# Patient Record
Sex: MALE | Race: WHITE | NOT HISPANIC OR LATINO | Employment: OTHER | URBAN - METROPOLITAN AREA
[De-identification: names, ages, dates, MRNs, and addresses within clinical notes are randomized per-mention and may not be internally consistent; named-entity substitution may affect disease eponyms.]

---

## 2017-02-23 ENCOUNTER — TRANSFERRED RECORDS (OUTPATIENT)
Dept: HEALTH INFORMATION MANAGEMENT | Facility: CLINIC | Age: 67
End: 2017-02-23

## 2017-03-01 ENCOUNTER — TRANSFERRED RECORDS (OUTPATIENT)
Dept: HEALTH INFORMATION MANAGEMENT | Facility: CLINIC | Age: 67
End: 2017-03-01

## 2017-03-03 ENCOUNTER — TRANSFERRED RECORDS (OUTPATIENT)
Dept: HEALTH INFORMATION MANAGEMENT | Facility: CLINIC | Age: 67
End: 2017-03-03

## 2017-03-06 ENCOUNTER — TRANSFERRED RECORDS (OUTPATIENT)
Dept: HEALTH INFORMATION MANAGEMENT | Facility: CLINIC | Age: 67
End: 2017-03-06

## 2017-03-09 ENCOUNTER — TRANSFERRED RECORDS (OUTPATIENT)
Dept: HEALTH INFORMATION MANAGEMENT | Facility: CLINIC | Age: 67
End: 2017-03-09

## 2017-04-13 ENCOUNTER — MEDICAL CORRESPONDENCE (OUTPATIENT)
Dept: CARDIAC REHAB | Facility: CLINIC | Age: 67
End: 2017-04-13

## 2017-04-13 ENCOUNTER — HOSPITAL ENCOUNTER (OUTPATIENT)
Dept: CARDIAC REHAB | Facility: CLINIC | Age: 67
End: 2017-04-13
Attending: INTERNAL MEDICINE
Payer: MEDICARE

## 2017-04-13 VITALS — WEIGHT: 200.4 LBS | BODY MASS INDEX: 25.72 KG/M2 | HEIGHT: 74 IN

## 2017-04-13 PROCEDURE — 93798 PHYS/QHP OP CAR RHAB W/ECG: CPT | Performed by: OCCUPATIONAL THERAPIST

## 2017-04-13 PROCEDURE — 40000575 ZZH STATISTIC OP CARDIAC VISIT #2: Performed by: OCCUPATIONAL THERAPIST

## 2017-04-13 PROCEDURE — 40000116 ZZH STATISTIC OP CR VISIT: Performed by: OCCUPATIONAL THERAPIST

## 2017-04-13 PROCEDURE — 93797 PHYS/QHP OP CAR RHAB WO ECG: CPT | Mod: 59 | Performed by: OCCUPATIONAL THERAPIST

## 2017-04-13 ASSESSMENT — 6 MINUTE WALK TEST (6MWT)
TOTAL DISTANCE WALKED (FT): 1378
GENDER SELECTION: MALE
FEMALE CALC: 1572.1
PREDICTED: 2069.88
MALE CALC: 2057.34

## 2017-04-13 NOTE — PROGRESS NOTES
" 04/13/17 1400   Session  Simon Martines  AVReplacement, CABG x 1, LIMA   Session Initial Evaluation and Exercise Prescription   Certified through this date 05/12/17   Cardiac Rehab Assessment  I have established, reviewed and made necessary changes to the individualized treatment plan and exercise prescription for this patient.    Physician Name (printed): ________________________   Date: _______  Time: ______    Physician Signature: ___________________________________________   Cardiac Rehab Assessment Pt and wife presented as interested, but wife was much more enthusiastic as Pt stated he is \"not an exercise enthusiast\".  He is willing to participate and established 3 goals.  His recovery at home has been uneventful.  Pt will benefit from skilled services for progressive, monitored exercise, targeted education for risk factor control, and behavior change counseling to advance him in risk factor control and home program establishment.     The patient's history and clinical status including hemodynamics and ECG were evaluated.  The patient was assessed to be stable and appropriate to begin exercise.   The patient's functional capacity and exercise prescription were determined by the completion of the 6 minute walk test.  See results below.  The patient was oriented to the program.  Risk factor profile was completed. Goals and objectives were discussed and established with Pt. CV response was WNL. No symptoms, complaints or pain were reported. Good prognosis for reaching goals.   Plan to progress to 30-35 minutes of exercise prior to discharge from cardiac rehab.  Initial THR of 20-30 beats above RHR; Effort rating of 4-6.  Initiate muscle conditioning as appropriate.  Provide risk factor education and behavior change counseling.    General Information   Treatment Diagnosis Valve Replacement  (Aortic)   Date of Treatment Diagnosis 03/01/17   Secondary Treatment Diagnosis Coronary Artery Bypass  (x 1 LIMA)   Significant " "Past CV History (post op SVT, paroxysmal atrial fibrillation)   Comorbidities Malignancy;Renal Disease  (renal cell carcinoma, s/p nephrectomy)   Other Medical History CKD, HTN, depression.   Lead up symptoms no sx, identified in exam   Hospital Location [facility]   Hospital Discharge Date 03/09/17   Signs and Symptoms Post Hospital Discharge None   Outpatient Cardiac Rehab Start Date 04/13/17   Primary Physician Henrry Dowling   Primary Physician Follow Up Advised to schedule appointment   Surgeon Nick Loredo   Surgeon Follow Up NA   Cardiologist Kavita Florentino   Cardiologist Follow Up Scheduled  (May 2017)   Ejection Fraction 35-40% post surgery  (next scheduled in 2 weeks)   Risk Stratification Moderate   Summary of Cath Report   Summary of Cath Report No information available   Living and Work Status    Living Arrangements and Social Status house   Support System Live with an adult   Return to Employment Retired   Occupation    Preventative Medications   CMS recommended medications Antiplatelets;Lipid Lowering;Anticoagulants;Ace inhibitors;Beta Blocker;Pneumonia vaccination  (anticipate off Coumadin eventually)   Falls Screen   Have you fallen two or more times in the past year? No   Have you fallen and had an injury in the past year? No   Pain   Patient Currently in Pain Yes   Pain Location L shoulder  (surgical related)   Pain Rating 2/10  (can go up to9)   Pain Description Ache;Sharp;Dull   Pain Description Comment encouraged Pt to resume Tylenol consistently.  This shoulder has been \"weak\" according to Pt for several years, surgery has just exacerbated this, including some positional numbness.    Pain Treatment Recommendations appropriate stretching   Physical Assessments   Incisions WNL   Edema None   Right Lung Sounds not assessed   Left Lung Sounds not assessed  (Pt recalls MD stating small amount of fluid in L lung)   Limitations Surgical  (sternal)   Individualized Treatment Plan   Monitored " "Sessions Scheduled 24   Monitored Sessions Attended 1   Oxygen   Supplemental Oxygen needed No   Nutrition Management - Weight Management   Assessment Initial Assessment   Age 66   Weight 90.9 kg (200 lb 6.4 oz)   Height 1.88 m (6' 2\")   BMI (Calculated) 25.78   Initial Rate Your Plate Score. Dietary tool to assess eating patterns. Scores range from 24 to 72. The higher the score the healthier the eating pattern. 57   Nutrition Management - Lipids   Lipids Labs Available   Date 09/30/16   Total Cholesterol 226   Triglycerides 235   HDL 42      Prescribed Lipid Medication Yes   Statin Intensity Intensity Not Indicated   Nutrition Management - Diabetes   Diabetes No   Nutrition Management Summary   Dietary Recommendations Low Fat;Low Cholesterol;Low Sodium;Renal  (Pt has gout restrictions)   Stages of Change for Diet Compliance Contemplation   Interventions Planned (none per Pt, wife plans to attend classes.)   Psychosocial Management   Psychosocial Assessment Initial   Is there history of clinical depression or increased risk of depression? History of clinical depression   Current Level of Stress per Patient Report Mild   Current Coping Skills (Paxil, breathing, ETOH, )   Initial Patient Health Questionnaire -9 Score (PHQ-9) for depression. 5-9 Minimal symptoms, 10-14 Minor depression, 15-19 Major depression, moderately severe, > 20 Major depression, severe  10   Initial [facility] Survey score.  Quality of Life:   If total score > 25 review individual areas where patient rated a 4 or 5.  Consider patients current medical condition and what role that plays on the score.   Adjust treatment protocol to improve areas of concern.  Consider the following:  PHQ9 score, DASI, and re-assessment within the next 30 days to assist with developing treatments.  30   Stages of Change Maintenance   Interventions Planned (none per Pt)   Psychosocial Comments Pt admits to occasional anxiety   Other Core Components - " Hypertension   History of or Diagnosis of Hypertension Yes   Currently taking Anti-Hypertensives Yes;Beta blocker;Ace Inhibitor   Other Core Components - Tobacco   History of Tobacco Use Never  (heavy second hand smoke)   Other Core Components Summary   Patient Goals Yes   Goal #1 Description to establish a home exercise program of 3 times a week for 30-60 minutes.   Goal #1 Target Date 06/13/17   Activity/Exercise History   Activity/Exercise Assessment Initial   Activity/Exercise Status prior to event? Sedentary   Number of Days Currently participating in Moderate Physical Activity? 1-4   Number of Days Currently performing  Aerobic Exercise (including rehab)? 2   Number of Minutes per Session Currently of Aerobic Exercise (average)? 10   Current Stage of Change (Physical Activity) Preparation   Current Stage of Change (Aerobic Exercise) Preparation   Patient Goals Goal #1;Goal #2   Goal #1 Description to be able to walk 1 flight of stairs without effort and a second with no significant SOB.   Goal #1 Target Date 06/13/17   Goal #2 Description to be able to walk 60 min with moderate pace/ some inclines with stable CV responses.   Goal #2 Target Date 06/13/17   Activity/Exercise Comments Pt is still hesitant about how much he wants to continue with aerobic exercise post rehab.   Exercise Assessment   6 Minute Walk Predicted - Gender Selection Male   6 Minute Walk Predicted (Male) 2057.34   6 Minute Walk Predicted (Female) 1572.1   Initial 6 Minute Walk Distance (Feet) 1378 ft   Resting HR 65 bpm   Exercise HR 80 bpm   Post Exercise HR 66 bpm   Resting /84   Exercise /78   Post Exercise /78   Effort Rating 6   Current MET Level 3   MET Level Goal 5-6   ECG Rhythm Normal sinus rhythm   Ectopy PACs   Current Symptoms Fatigue;Dyspnea   Limitations/Restrictions Sternal Precautions   Exercise Prescription   Mode Treadmill;Nustep;Airdyne;Weights   Duration/Time 15-30 min   Frequency 3 daysweek   THR (85%  of age predicted max HR) 131.75   OMNI Effort Rating (0-10 Scale) 4-6/10   Progression Intermittent bouts;Total exercise time of 20-30 minutes;Aerobic exercise to OMNI rating of 6 or below and at or below THR;Progress peak intensity by 1/4 MET per week   Recommended Home Exercise   Type of Exercise Walking   Frequency (days per week) 1-3   Duration (minutes per session) 15-30 min  (start with 10 min discontinuous bouts)   Effort Rating Recommended 4-6/10   30 Day Exercise Plan Pt will be walking 20-30 continuous min at least 1 day off rehab.   Current Home Exercise   Type of Exercise Walking   Frequency (days per week) 1   Duration (minutes per session) 10   Learning Assessment   Learner Patient;Spouse/Significant Other   Primary Language English   Barriers to Learning Visual  (glasses)   Patient Education   Education recommended Other (see comment)  (Pt declined, wife plans to attend nutrition classes)

## 2017-04-18 ENCOUNTER — HOSPITAL ENCOUNTER (OUTPATIENT)
Dept: CARDIAC REHAB | Facility: CLINIC | Age: 67
End: 2017-04-18
Attending: INTERNAL MEDICINE
Payer: MEDICARE

## 2017-04-18 PROCEDURE — 40000116 ZZH STATISTIC OP CR VISIT: Performed by: REHABILITATION PRACTITIONER

## 2017-04-18 PROCEDURE — 93798 PHYS/QHP OP CAR RHAB W/ECG: CPT | Performed by: REHABILITATION PRACTITIONER

## 2017-04-20 ENCOUNTER — HOSPITAL ENCOUNTER (OUTPATIENT)
Dept: CARDIAC REHAB | Facility: CLINIC | Age: 67
End: 2017-04-20
Attending: INTERNAL MEDICINE
Payer: MEDICARE

## 2017-04-20 PROCEDURE — 93798 PHYS/QHP OP CAR RHAB W/ECG: CPT | Performed by: REHABILITATION PRACTITIONER

## 2017-04-20 PROCEDURE — 40000116 ZZH STATISTIC OP CR VISIT: Performed by: REHABILITATION PRACTITIONER

## 2017-04-25 ENCOUNTER — HOSPITAL ENCOUNTER (OUTPATIENT)
Dept: CARDIAC REHAB | Facility: CLINIC | Age: 67
End: 2017-04-25
Attending: INTERNAL MEDICINE
Payer: MEDICARE

## 2017-04-25 VITALS — HEIGHT: 74 IN | WEIGHT: 201.4 LBS | BODY MASS INDEX: 25.85 KG/M2

## 2017-04-25 PROCEDURE — 93798 PHYS/QHP OP CAR RHAB W/ECG: CPT | Performed by: OCCUPATIONAL THERAPIST

## 2017-04-25 PROCEDURE — 40000116 ZZH STATISTIC OP CR VISIT: Performed by: OCCUPATIONAL THERAPIST

## 2017-04-25 ASSESSMENT — 6 MINUTE WALK TEST (6MWT)
TOTAL DISTANCE WALKED (FT): 1378
GENDER SELECTION: MALE
MALE CALC: 2055.72
FEMALE CALC: 1568.96
PREDICTED: 2068.26

## 2017-04-25 NOTE — PROGRESS NOTES
" 04/25/17 1200   Session    Simon Martines  66 year old  Aortic Valve Replacement   CABG x 1    Physician cosignature/electronic signature indicates approval of this ITP document. I have established, reviewed and made necessary changes to the individualized treatment plan and exercise prescription for this patient.     Session 30 Day Individualized Treatment Plan   Certified through this date 06/10/17   Cardiac Rehab Assessment   Cardiac Rehab Assessment Pt and wife presented as interested, but wife was much more enthusiastic as Pt stated he is \"not an exercise enthusiast\".  He is willing to participate and established 3 goals.  His recovery at home has been uneventful.  Pt will benefit from skilled services for progressive, monitored exercise, targeted education for risk factor control, and behavior change counseling to advance him in risk factor control and home program establishment. 4/25/17 Patient is gradually starting to feel better in his recovery. His SOB is improving, engergy levels are increasing and incisional soreness is lessening. Patient currently tolerates a MET level of 3.1. He will initiate home exercise after today walking on his off days. Patient continues to benefit from skilled therapy to monitor CV response to exercise, to educate on risk management and behavior change to achieve patient's goals.    General Information   Treatment Diagnosis Valve Replacement  (Aortic)   Date of Treatment Diagnosis 03/01/17   Secondary Treatment Diagnosis Coronary Artery Bypass  (x 1 LIMA)   Significant Past CV History (post op SVT, paroxysmal atrial fibrillation)   Comorbidities Malignancy;Renal Disease  (renal cell carcinoma, s/p nephrectomy)   Other Medical History CKD, HTN, depression.   Lead up symptoms no sx, identified in exam   Hospital Location [facility]   Hospital Discharge Date 03/09/17   Signs and Symptoms Post Hospital Discharge None   Outpatient Cardiac Rehab Start Date 04/13/17   Primary Physician " "Henrry Dowling   Primary Physician Follow Up Advised to schedule appointment   Surgeon Nick Loredo   Surgeon Follow Up NA   Cardiologist Kavita Florentino   Cardiologist Follow Up Scheduled  (May 2017)   Ejection Fraction 50-55%   Risk Stratification High   Summary of Cath Report   Summary of Cath Report No information available   Living and Work Status    Living Arrangements and Social Status house   Support System Live with an adult   Return to Employment Retired   Occupation    Preventative Medications   CMS recommended medications Antiplatelets;Lipid Lowering;Anticoagulants;Ace inhibitors;Beta Blocker;Pneumonia vaccination  (anticipate off Coumadin eventually)   Falls Screen   Have you fallen two or more times in the past year? No   Have you fallen and had an injury in the past year? No   Pain   Patient Currently in Pain Yes   Pain Location L shoulder, upper back pain   (surgical related)   Pain Rating 2/10  (can go up to9)   Pain Description Ache;Sharp;Dull   Pain Description Comment encouraged Pt to resume Tylenol consistently.  This shoulder has been \"weak\" according to Pt for several years, surgery has just exacerbated this, including some positional numbness.  4/25/17 Patient feels left shoulder pain is improving, but reports mild upper back pain, which improves with movement.   Pain Treatment Recommendations appropriate stretching 4/25/17 Encouraged patient to start engaging in surgical stretches regularly. Demonstrated stretches to patient and he verbally agreed to start practicing them at home to help with back pain.   Physical Assessments   Incisions WNL   Edema None   Right Lung Sounds not assessed   Left Lung Sounds not assessed   Limitations Surgical  (sternal)   Individualized Treatment Plan   Monitored Sessions Scheduled 24   Monitored Sessions Attended 4   Oxygen   Supplemental Oxygen needed No   Nutrition Management - Weight Management   Assessment Re-assessment   Age 66   Weight 91.4 kg " "(201 lb 6.4 oz)   Height 1.88 m (6' 2.02\")   BMI (Calculated) 25.9   Initial Rate Your Plate Score. Dietary tool to assess eating patterns. Scores range from 24 to 72. The higher the score the healthier the eating pattern. 57   Nutrition Management - Lipids   Lipids Labs Available   Date 09/30/16   Total Cholesterol 226   Triglycerides 235   HDL 42      Prescribed Lipid Medication Yes   Statin Intensity Intensity Not Indicated   Nutrition Management - Diabetes   Diabetes No   Nutrition Management Summary   Dietary Recommendations Low Fat;Low Cholesterol;Low Sodium;Renal  (Pt has gout restrictions)   Stages of Change for Diet Compliance Contemplation   Interventions Planned (none per Pt, wife plans to attend classes.)   Psychosocial Management   Psychosocial Assessment Re-assessment   Is there history of clinical depression or increased risk of depression? History of clinical depression   Current Level of Stress per Patient Report Mild   Current Coping Skills (Paxil, breathing, ETOH, )   Initial Patient Health Questionnaire -9 Score (PHQ-9) for depression. 5-9 Minimal symptoms, 10-14 Minor depression, 15-19 Major depression, moderately severe, > 20 Major depression, severe  10   Initial [employer] Survey score.  Quality of Life:   If total score > 25 review individual areas where patient rated a 4 or 5.  Consider patients current medical condition and what role that plays on the score.   Adjust treatment protocol to improve areas of concern.  Consider the following:  PHQ9 score, DASI, and re-assessment within the next 30 days to assist with developing treatments.  30   Stages of Change Maintenance   Interventions Planned Patient to verbalize understanding of behavioral assessment results;Reassess PHQ-9 and/or [employer] Surveys if outside of defined limits   Psychosocial Comments Pt admits to occasional anxiety   Other Core Components - Hypertension   History of or Diagnosis of Hypertension Yes " "  Currently taking Anti-Hypertensives Yes;Beta blocker;Ace Inhibitor   Hypertension Comments 4/25/17 BP has been WNLs   Other Core Components - Tobacco   History of Tobacco Use Never  (heavy second hand smoke)   Other Core Components Summary   Patient Goals Yes   Goal #1 Description to establish a home exercise program of 3 times a week for 30-60 minutes.   Goal #1 Target Date 06/13/17   Goal #1 Progress Towards Goal 4/25/17 Patient has not been walking on his off days of rehab, but agreed to start after today.   Activity/Exercise History   Activity/Exercise Assessment Re-assessment   Activity/Exercise Status prior to event? Sedentary   Number of Days Currently participating in Moderate Physical Activity? 1-4   Number of Days Currently performing  Aerobic Exercise (including rehab)? 3   Number of Minutes per Session Currently of Aerobic Exercise (average)? 35   Current Stage of Change (Physical Activity) Preparation   Current Stage of Change (Aerobic Exercise) Preparation   Patient Goals Goal #1;Goal #2   Goal #1 Description to be able to walk 1 flight of stairs without effort and a second with no significant SOB.   Goal #1 Target Date 06/13/17   Goal #1 Progress Towards Goal 4/25/17 Patient has noticed improvement with SOB, especially while laying down. Patient continues to feel SOB while stair climbing. He will continue to progress towards this goal.   Goal #2 Description to be able to walk 60 min with moderate pace/ some inclines with stable CV responses.   Goal #2 Target Date 06/13/17   Goal #2 Progress Towards Goal 4/25/17 Reviewed goal with patient. Patient is not currently engaging in home exercise, but will start after today. Patient stated, \"I really need to start doing it. I will start doing it.\" He currently tolerates walking on treadmill for 15 minutes at a peak MET level of 2.5. Patient feels this level is appropriately challenging.    Activity/Exercise Comments Pt is still hesitant about how much he " wants to continue with aerobic exercise post rehab.   Exercise Assessment   6 Minute Walk Predicted - Gender Selection Male   6 Minute Walk Predicted (Male) 2055.72   6 Minute Walk Predicted (Female) 1568.96   Initial 6 Minute Walk Distance (Feet) 1378 ft   Resting HR 68 bpm   Exercise HR 80 bpm   Post Exercise HR 70 bpm   Resting /78   Exercise /64   Post Exercise /76   Effort Rating 5   Current MET Level 3.1   MET Level Goal 5-6   ECG Rhythm Normal sinus rhythm   Ectopy PVCs   Current Symptoms Fatigue;Dyspnea;Incisional pain   Limitations/Restrictions Sternal Precautions   Exercise Prescription   Mode Treadmill;Nustep;Airdyne;Weights   Duration/Time 15-30 min   Frequency 3 daysweek   THR (85% of age predicted max HR) 131.75   OMNI Effort Rating (0-10 Scale) 4-6/10   Progression Intermittent bouts;Total exercise time of 20-30 minutes;Aerobic exercise to OMNI rating of 6 or below and at or below THR;Progress peak intensity by 1/4 MET per week   Recommended Home Exercise   Type of Exercise Walking   Frequency (days per week) 1-3   Duration (minutes per session) 15-30 min  (start with 10 min discontinuous bouts)   Effort Rating Recommended 4-6/10   30 Day Exercise Plan Pt will be walking 20-30 continuous min at least 1 day off rehab.   Current Home Exercise   Type of Exercise None   Frequency (days per week) 0   Duration (minutes per session) 0   Follow-up/On-going Support   Provider follow-up needed on the following No follow-up needed   Learning Assessment   Learner Patient;Spouse/Significant Other   Primary Language English   Barriers to Learning Visual  (glasses)   Patient Education   Education recommended Other (see comment)  (Pt declined, wife plans to attend nutrition classes)   Education classes attended Patient Declined/Refused All Education

## 2017-04-27 ENCOUNTER — HOSPITAL ENCOUNTER (OUTPATIENT)
Dept: CARDIAC REHAB | Facility: CLINIC | Age: 67
End: 2017-04-27
Attending: INTERNAL MEDICINE
Payer: MEDICARE

## 2017-04-27 PROCEDURE — 93798 PHYS/QHP OP CAR RHAB W/ECG: CPT | Performed by: OCCUPATIONAL THERAPIST

## 2017-04-27 PROCEDURE — 40000116 ZZH STATISTIC OP CR VISIT: Performed by: OCCUPATIONAL THERAPIST

## 2017-04-28 ENCOUNTER — HOSPITAL ENCOUNTER (OUTPATIENT)
Dept: CARDIAC REHAB | Facility: CLINIC | Age: 67
End: 2017-04-28
Attending: INTERNAL MEDICINE
Payer: MEDICARE

## 2017-04-28 PROCEDURE — 40000116 ZZH STATISTIC OP CR VISIT: Performed by: OCCUPATIONAL THERAPIST

## 2017-04-28 PROCEDURE — 93798 PHYS/QHP OP CAR RHAB W/ECG: CPT | Performed by: OCCUPATIONAL THERAPIST

## 2017-05-01 ENCOUNTER — HOSPITAL ENCOUNTER (OUTPATIENT)
Dept: CARDIAC REHAB | Facility: CLINIC | Age: 67
End: 2017-05-01
Attending: INTERNAL MEDICINE
Payer: MEDICARE

## 2017-05-01 PROCEDURE — 93798 PHYS/QHP OP CAR RHAB W/ECG: CPT | Performed by: OCCUPATIONAL THERAPIST

## 2017-05-01 PROCEDURE — 40000116 ZZH STATISTIC OP CR VISIT: Performed by: OCCUPATIONAL THERAPIST

## 2017-05-03 ENCOUNTER — HOSPITAL ENCOUNTER (OUTPATIENT)
Dept: CARDIAC REHAB | Facility: CLINIC | Age: 67
End: 2017-05-03
Attending: INTERNAL MEDICINE
Payer: MEDICARE

## 2017-05-03 PROCEDURE — 40000116 ZZH STATISTIC OP CR VISIT: Performed by: OCCUPATIONAL THERAPIST

## 2017-05-03 PROCEDURE — 93798 PHYS/QHP OP CAR RHAB W/ECG: CPT | Performed by: OCCUPATIONAL THERAPIST

## 2017-05-05 ENCOUNTER — HOSPITAL ENCOUNTER (OUTPATIENT)
Dept: CARDIAC REHAB | Facility: CLINIC | Age: 67
End: 2017-05-05
Attending: INTERNAL MEDICINE
Payer: MEDICARE

## 2017-05-05 VITALS — BODY MASS INDEX: 25.95 KG/M2 | HEIGHT: 74 IN | WEIGHT: 202.2 LBS

## 2017-05-05 PROCEDURE — 93797 PHYS/QHP OP CAR RHAB WO ECG: CPT | Performed by: OCCUPATIONAL THERAPIST

## 2017-05-05 PROCEDURE — 40000116 ZZH STATISTIC OP CR VISIT: Performed by: REHABILITATION PRACTITIONER

## 2017-05-05 PROCEDURE — 93798 PHYS/QHP OP CAR RHAB W/ECG: CPT | Performed by: REHABILITATION PRACTITIONER

## 2017-05-05 PROCEDURE — 40000575 ZZH STATISTIC OP CARDIAC VISIT #2: Performed by: OCCUPATIONAL THERAPIST

## 2017-05-05 ASSESSMENT — 6 MINUTE WALK TEST (6MWT)
TOTAL DISTANCE WALKED (FT): 1378
PREDICTED: 2066.15
GENDER SELECTION: MALE
MALE CALC: 2053.62
FEMALE CALC: 1566.23

## 2017-05-10 ENCOUNTER — HOSPITAL ENCOUNTER (OUTPATIENT)
Dept: CARDIAC REHAB | Facility: CLINIC | Age: 67
End: 2017-05-10
Attending: INTERNAL MEDICINE
Payer: MEDICARE

## 2017-05-10 PROCEDURE — 40000116 ZZH STATISTIC OP CR VISIT: Performed by: REHABILITATION PRACTITIONER

## 2017-05-10 PROCEDURE — 93798 PHYS/QHP OP CAR RHAB W/ECG: CPT | Performed by: REHABILITATION PRACTITIONER

## 2017-05-12 ENCOUNTER — HOSPITAL ENCOUNTER (OUTPATIENT)
Dept: CARDIAC REHAB | Facility: CLINIC | Age: 67
End: 2017-05-12
Attending: INTERNAL MEDICINE
Payer: MEDICARE

## 2017-05-12 PROCEDURE — 40000116 ZZH STATISTIC OP CR VISIT: Performed by: CLINICAL EXERCISE PHYSIOLOGIST

## 2017-05-12 PROCEDURE — 93798 PHYS/QHP OP CAR RHAB W/ECG: CPT | Performed by: CLINICAL EXERCISE PHYSIOLOGIST

## 2017-05-17 ENCOUNTER — HOSPITAL ENCOUNTER (OUTPATIENT)
Dept: CARDIAC REHAB | Facility: CLINIC | Age: 67
End: 2017-05-17
Attending: INTERNAL MEDICINE
Payer: MEDICARE

## 2017-05-17 PROCEDURE — 40000116 ZZH STATISTIC OP CR VISIT: Performed by: REHABILITATION PRACTITIONER

## 2017-05-17 PROCEDURE — 93798 PHYS/QHP OP CAR RHAB W/ECG: CPT | Performed by: REHABILITATION PRACTITIONER

## 2017-05-19 ENCOUNTER — HOSPITAL ENCOUNTER (OUTPATIENT)
Dept: CARDIAC REHAB | Facility: CLINIC | Age: 67
End: 2017-05-19
Attending: INTERNAL MEDICINE
Payer: MEDICARE

## 2017-05-19 PROCEDURE — 40000116 ZZH STATISTIC OP CR VISIT: Performed by: OCCUPATIONAL THERAPIST

## 2017-05-19 PROCEDURE — 93798 PHYS/QHP OP CAR RHAB W/ECG: CPT | Performed by: OCCUPATIONAL THERAPIST

## 2017-05-22 ENCOUNTER — HOSPITAL ENCOUNTER (OUTPATIENT)
Dept: CARDIAC REHAB | Facility: CLINIC | Age: 67
End: 2017-05-22
Attending: INTERNAL MEDICINE
Payer: MEDICARE

## 2017-05-22 VITALS — HEIGHT: 74 IN | BODY MASS INDEX: 26.26 KG/M2 | WEIGHT: 204.6 LBS

## 2017-05-22 PROCEDURE — 40000116 ZZH STATISTIC OP CR VISIT: Performed by: OCCUPATIONAL THERAPIST

## 2017-05-22 PROCEDURE — 93798 PHYS/QHP OP CAR RHAB W/ECG: CPT | Performed by: OCCUPATIONAL THERAPIST

## 2017-05-22 ASSESSMENT — 6 MINUTE WALK TEST (6MWT)
GENDER SELECTION: MALE
TOTAL DISTANCE WALKED (FT): 1378
FEMALE CALC: 1558.04
PREDICTED: 2059.81
MALE CALC: 2047.33

## 2017-05-22 NOTE — PROGRESS NOTES
" 05/22/17 1300   Session  Simon Martines  Aortic Valve Replacement   Session 60 Day Individualized Treatment Plan   Certified through this date 07/09/17   Cardiac Rehab Assessment  Physician cosignature/electronic signature indicates approval of this ITP document. I have established, reviewed and made necessary changes to the individualized treatment plan and exercise prescription for this patient.   Cardiac Rehab Assessment Pt and wife presented as interested, but wife was much more enthusiastic as Pt stated he is \"not an exercise enthusiast\".  He is willing to participate and established 3 goals.  His recovery at home has been uneventful.  Pt will benefit from skilled services for progressive, monitored exercise, targeted education for risk factor control, and behavior change counseling to advance him in risk factor control and home program establishment. 4/25/17 Patient is gradually starting to feel better in his recovery. His SOB is improving, engergy levels are increasing and incisional soreness is lessening. Patient currently tolerates a MET level of 3.1. He will initiate home exercise after today walking on his off days. Patient continues to benefit from skilled therapy to monitor CV response to exercise, to educate on risk management and behavior change to achieve patient's goals.  5/5 Pt was seen for a short private consult. Answered several questions regarding his symptom of occasional lightheadedness, and plans of action. He continues to benefit from skilled services for progressive, monitored exercise, targeted education as issue arise and behavior change counseling regarding long term development of aerobic exercise.  5/22 Pt still has occasional lightheadedness.  His goals were reviewed and update for next ITP period.  He has not yet started a home program, but does feel he is making progress in general activity stamina.  He continues to benefit from skilled services for progressive, monitored exercise, " targeted education as it arises and behavior change counseling for his home exercise program.   General Information   Treatment Diagnosis Valve Replacement  (Aortic)   Date of Treatment Diagnosis 03/01/17   Secondary Treatment Diagnosis Coronary Artery Bypass  (x 1 LIMA)   Significant Past CV History (post op SVT, paroxysmal atrial fibrillation)   Comorbidities Malignancy;Renal Disease  (renal cell carcinoma, s/p nephrectomy)   Other Medical History CKD, HTN, depression.   Lead up symptoms no sx, identified in exam   Hospital Location [facility]   Hospital Discharge Date 03/09/17   Signs and Symptoms Post Hospital Discharge None   Outpatient Cardiac Rehab Start Date 04/13/17   Primary Physician Henrry Dowling   Primary Physician Follow Up Advised to schedule appointment   Surgeon Nick Loredo   Surgeon Follow Up NA   Cardiologist Kavita Florentino   Cardiologist Follow Up Scheduled  (May 2017)   Ejection Fraction 50-55%   Risk Stratification High   Summary of Cath Report   Summary of Cath Report No information available   Living and Work Status    Living Arrangements and Social Status house   Support System Live with an adult   Return to Employment Retired   Occupation    Preventative Medications   CMS recommended medications Antiplatelets;Lipid Lowering;Anticoagulants;Ace inhibitors;Beta Blocker;Pneumonia vaccination  (anticipate off Coumadin eventually)   Falls Screen   Have you fallen two or more times in the past year? No   Have you fallen and had an injury in the past year? No   Pain   Patient Currently in Pain No   Pain Location    Physical Assessments   Incisions WNL   Edema None   Right Lung Sounds not assessed   Left Lung Sounds not assessed  (Pt recalls MD stating small amount of fluid in L lung)   Limitations Surgical  (sternal)   Individualized Treatment Plan   Monitored Sessions Scheduled 24   Monitored Sessions Attended 14   Oxygen   Supplemental Oxygen needed No   Nutrition Management - Weight  "Management   Assessment Re-assessment   Age 66   Weight 92.8 kg (204 lb 9.6 oz)   Height 1.88 m (6' 2.02\")   BMI (Calculated) 26.31   Initial Rate Your Plate Score. Dietary tool to assess eating patterns. Scores range from 24 to 72. The higher the score the healthier the eating pattern. 57   Nutrition Management - Lipids   Lipids Labs Available   Date 09/30/16   Total Cholesterol 226   Triglycerides 235   HDL 42      Prescribed Lipid Medication Yes   Statin Intensity Intensity Not Indicated   Nutrition Management - Diabetes   Diabetes No   Nutrition Management Summary   Dietary Recommendations Low Fat;Low Cholesterol;Low Sodium;Renal  (Pt has gout restrictions)   Stages of Change for Diet Compliance Contemplation   Interventions Planned (none per Pt, wife plans to attend classes.)   Nutrition Summary Comments 5/22 wife has not attended any classes as yet.    Psychosocial Management   Psychosocial Assessment Re-assessment   Is there history of clinical depression or increased risk of depression? History of clinical depression   Current Level of Stress per Patient Report Mild   Current Coping Skills (Paxil, breathing, ETOH, )   Initial Patient Health Questionnaire -9 Score (PHQ-9) for depression. 5-9 Minimal symptoms, 10-14 Minor depression, 15-19 Major depression, moderately severe, > 20 Major depression, severe  10   Initial [employer] COOP Survey score.  Quality of Life:   If total score > 25 review individual areas where patient rated a 4 or 5.  Consider patients current medical condition and what role that plays on the score.   Adjust treatment protocol to improve areas of concern.  Consider the following:  PHQ9 score, DASI, and re-assessment within the next 30 days to assist with developing treatments.  30   Stages of Change Maintenance   Interventions Planned Patient to verbalize understanding of behavioral assessment results;Reassess PHQ-9 and/or [employer] COOP Surveys if outside of defined limits " "  Interventions In Progress or Completed Patient verbalizes understanding of behavioral assessment scores   Psychosocial Comments Pt admits to occasional anxiety   Other Core Components - Hypertension   History of or Diagnosis of Hypertension Yes   Currently taking Anti-Hypertensives Yes;Beta blocker;Ace Inhibitor   Hypertension Comments 4/25/17 BP has been WNLs  5/5 Pt is questioning if his BP levels are \"too low for him\" as he continues to have occasional postural hypotension.  Encouraged Pt to call his MD's nurse to discuss. Time spent in consult exploring various causes and mangement strategies. 5/22 BP continues to be wnl, although Pt occasionally still c/o lightheadedness.    Other Core Components - Tobacco   History of Tobacco Use Never  (heavy second hand smoke)   Other Core Components Summary   Patient Goals Yes   Goal #1 Description to establish a home exercise program of 3 times a week for 30-60 minutes.   Goal #1 Target Date 06/13/17   Goal #1 Progress Towards Goal 4/25/17 Patient has not been walking on his off days of rehab, but agreed to start after today.  5/5 Pt walked just once this past week while walking the dog with his wife. Pt is still in contemplation stage for long term pursuit of an aerobic home program.  5/22 Pt has not exercised at home since the intial attempt. He related that he had felt very tired after that walk, but had also tried to walk briskly.  Reviewed that the initial goal is to increase total time at a normal pace. He is willing to start.  As Pt continued to be very vague, he was assisted to identify Tuesday and Saturday as the most likely to be successful days, with afternoon being the best time. At this point, Pt was not willing to commit to them, but is aware that these days/times will work best for him.  He will report next week how things went.    Activity/Exercise History   Activity/Exercise Assessment Re-assessment   Activity/Exercise Status prior to event? Sedentary " "  Number of Days Currently participating in Moderate Physical Activity? 1-4   Number of Days Currently performing  Aerobic Exercise (including rehab)? 3  (rehab only)   Number of Minutes per Session Currently of Aerobic Exercise (average)? 35   Current Stage of Change (Physical Activity) Preparation   Current Stage of Change (Aerobic Exercise) Preparation   Patient Goals Goal #1;Goal #2   Goal #1 Description to be able to walk 1 flight of stairs without effort and a second with no significant SOB.   Goal #1 Target Date 06/13/17   Goal #1 Progress Towards Goal 4/25/17 Patient has noticed improvement with SOB, especially while laying down. Patient continues to feel SOB while stair climbing. He will continue to progress towards this goal. 5/5 Pt has been able to climb a flight of stairs continuously, but still mildly SOB.  5/22 Pt is able to walk the half flights in his 4 level home without difficulty.  A full flight still leaves him mildly SOB.   Goal #2 Description to be able to walk 60 min with moderate pace/ some inclines with stable CV responses.   Goal #2 Target Date 06/13/17   Goal #2 Progress Towards Goal 4/25/17 Reviewed goal with patient. Patient is not currently engaging in home exercise, but will start after today. Patient stated, \"I really need to start doing it. I will start doing it.\" He currently tolerates walking on treadmill for 15 minutes at a peak MET level of 2.5. Patient feels this level is appropriately challenging.   5/5 Pt has been having some aggravation to his diverticulitis, so has backed off a little in exercise. Discussed change in equipment, as well as more time on TM which would assist in his goal. Pt \"will consider it\".  Also confirmed that Pts HR, BP and rhythm have all been wnl.  5/22 Educated Pt re benefits of exercise being maintained only if he continues to exercise post rehab. Pt stated he \"knows this, just has trouble getting going\".  See goal under Core Components.  "   Activity/Exercise Comments Pt is still hesitant about how much he wants to continue with aerobic exercise post rehab.   Exercise Assessment   6 Minute Walk Predicted - Gender Selection Male   6 Minute Walk Predicted (Male) 2047.33   6 Minute Walk Predicted (Female) 1558.04   Initial 6 Minute Walk Distance (Feet) 1378 ft   Resting HR 66 bpm   Exercise HR 92 bpm   Post Exercise HR 63 bpm   Resting /70   Exercise /64   Post Exercise /72   Effort Rating 5   Current MET Level 3.6   MET Level Goal 5-6   ECG Rhythm Normal sinus rhythm   Ectopy PVCs   Current Symptoms Dyspnea   Limitations/Restrictions Sternal Precautions   Exercise Prescription   Mode Treadmill;Nustep;Airdyne;Weights   Duration/Time 30-45 min   Frequency 3 daysweek   THR (85% of age predicted max HR) 131.75   OMNI Effort Rating (0-10 Scale) 4-6/10   Progression Aerobic exercise to OMNI rating of 6 or below and at or below THR;Progress peak intensity by 1/4 MET per week;Total exercise time of 30-45 minutes   Recommended Home Exercise   Type of Exercise Walking   Frequency (days per week) 2   Duration (minutes per session) 15-30 min  (start with 20 min walk at normal pace)   Effort Rating Recommended 4-6/10   30 Day Exercise Plan Pt will be walking 20-30 continuous min at least 1 day off rehab.   Current Home Exercise   Type of Exercise None   Frequency (days per week)    Follow-up/On-going Support   Provider follow-up needed on the following No follow-up needed   Learning Assessment   Learner Patient;Spouse/Significant Other   Primary Language English   Barriers to Learning Visual  (glasses)   Patient Education   Education recommended Other (see comment)  (Pt declined, wife plans to attend nutrition classes)   Education classes attended Patient Declined/Refused All Education

## 2017-05-24 ENCOUNTER — HOSPITAL ENCOUNTER (OUTPATIENT)
Dept: CARDIAC REHAB | Facility: CLINIC | Age: 67
End: 2017-05-24
Attending: INTERNAL MEDICINE
Payer: MEDICARE

## 2017-05-24 PROCEDURE — 40000116 ZZH STATISTIC OP CR VISIT: Performed by: OCCUPATIONAL THERAPIST

## 2017-05-24 PROCEDURE — 93798 PHYS/QHP OP CAR RHAB W/ECG: CPT | Performed by: OCCUPATIONAL THERAPIST

## 2017-05-26 ENCOUNTER — HOSPITAL ENCOUNTER (OUTPATIENT)
Dept: CARDIAC REHAB | Facility: CLINIC | Age: 67
End: 2017-05-26
Attending: INTERNAL MEDICINE
Payer: MEDICARE

## 2017-05-26 PROCEDURE — 93798 PHYS/QHP OP CAR RHAB W/ECG: CPT | Performed by: OCCUPATIONAL THERAPIST

## 2017-05-26 PROCEDURE — 40000116 ZZH STATISTIC OP CR VISIT: Performed by: OCCUPATIONAL THERAPIST

## 2017-06-02 ENCOUNTER — HOSPITAL ENCOUNTER (OUTPATIENT)
Dept: CARDIAC REHAB | Facility: CLINIC | Age: 67
End: 2017-06-02
Attending: INTERNAL MEDICINE
Payer: MEDICARE

## 2017-06-02 PROCEDURE — 40000116 ZZH STATISTIC OP CR VISIT: Performed by: OCCUPATIONAL THERAPIST

## 2017-06-02 PROCEDURE — 93798 PHYS/QHP OP CAR RHAB W/ECG: CPT | Performed by: OCCUPATIONAL THERAPIST

## 2017-06-05 ENCOUNTER — HOSPITAL ENCOUNTER (OUTPATIENT)
Dept: CARDIAC REHAB | Facility: CLINIC | Age: 67
End: 2017-06-05
Attending: INTERNAL MEDICINE
Payer: MEDICARE

## 2017-06-05 PROCEDURE — 40000116 ZZH STATISTIC OP CR VISIT

## 2017-06-05 PROCEDURE — 93798 PHYS/QHP OP CAR RHAB W/ECG: CPT

## 2017-06-07 ENCOUNTER — HOSPITAL ENCOUNTER (OUTPATIENT)
Dept: CARDIAC REHAB | Facility: CLINIC | Age: 67
End: 2017-06-07
Attending: INTERNAL MEDICINE
Payer: MEDICARE

## 2017-06-07 PROCEDURE — 40000116 ZZH STATISTIC OP CR VISIT

## 2017-06-07 PROCEDURE — 93798 PHYS/QHP OP CAR RHAB W/ECG: CPT

## 2017-07-18 ENCOUNTER — HOSPITAL ENCOUNTER (OUTPATIENT)
Dept: CARDIAC REHAB | Facility: CLINIC | Age: 67
End: 2017-07-18
Attending: INTERNAL MEDICINE
Payer: MEDICARE

## 2017-07-18 VITALS — WEIGHT: 207.2 LBS | BODY MASS INDEX: 26.59 KG/M2 | HEIGHT: 74 IN

## 2017-07-18 PROCEDURE — 40000116 ZZH STATISTIC OP CR VISIT: Performed by: OCCUPATIONAL THERAPIST

## 2017-07-18 PROCEDURE — 93798 PHYS/QHP OP CAR RHAB W/ECG: CPT | Performed by: OCCUPATIONAL THERAPIST

## 2017-07-18 ASSESSMENT — 6 MINUTE WALK TEST (6MWT)
FEMALE CALC: 1549.16
MALE CALC: 2040.5
TOTAL DISTANCE WALKED (FT): 1378
GENDER SELECTION: MALE
PREDICTED: 2052.95

## 2017-07-18 NOTE — PROGRESS NOTES
07/18/17 1349   Session  Simon ABEL Conrad  Dx:  AVR,CABG   Session 120 Day Individualized Treatment Plan   Certified through this date 08/05/17   Physician cosignature/electronic signature indicates approval of this ITP document. I have established, reviewed and made necessary changes to the individualized treatment plan and exercise prescription for this patient.  Cardiac Rehab Assessment   Cardiac Rehab Assessment Pt will benefit from skilled services for progressive, monitored exercise, targeted education for risk factor control, and behavior change counseling to advance him in risk factor control and home program establishment. 4/25/17 Patient is gradually starting to feel better in his recovery. His SOB is improving, engergy levels are increasing and incisional soreness is lessening. Patient currently tolerates a MET level of 3.1. He will initiate home exercise after today walking on his off days. Patient continues to benefit from skilled therapy to monitor CV response to exercise, to educate on risk management and behavior change to achieve patient's goals.  5/5 Pt was seen for a short private consult. Answered several questions regarding his symptom of occasional lightheadedness, and plans of action. He continues to benefit from skilled services for progressive, monitored exercise, targeted education as issue arise and behavior change counseling regarding long term development of aerobic exercise.  5/22 Pt still has occasional lightheadedness.  His goals were reviewed and update for next ITP period.  He has not yet started a home program, but does feel he is making progress in general activity stamina.  He continues to benefit from skilled services for progressive, monitored exercise, targeted education as it arises and behavior change counseling for his home exercise program.  7/18/17  PT has been gone from rehab due to a signficant bout of gout which caused pain in both feet and decreased weight  bearing/exercise ability.    7/18/17  PT has been gone from rehab due to a bout of gout which caused signficant pain in both of his feet and decreased his exercise/activity tolerance.  He also notes episodes of lightheadedness with activites such as going up steps or climbing hills.  He reports that he is working with his cardiologist regarding this.  He states that he has had a Holter monitor.  He states that he was not given exact results, but states there has been no concern expressed.  He does have noted PVC's which occur occasionally to frequently in rehab.  He states that his M.D. would like BP readings with incline walking on TM with these sx if possible and he would like to work towad this in rehab as gout pain allows.  He continues to benefit from skilled cardiac rehab for monitored exercise progression and education/behavior change counseling related to goals.           General Information   Treatment Diagnosis Valve Replacement  (Aortic)   Date of Treatment Diagnosis 03/01/17   Secondary Treatment Diagnosis Coronary Artery Bypass  (x 1 LIMA)   Significant Past CV History (post op SVT, paroxysmal atrial fibrillation)   Comorbidities Malignancy;Renal Disease  (renal cell carcinoma, s/p nephrectomy)   Other Medical History CKD, HTN, depression.   Lead up symptoms no sx, identified in exam   Hospital Location [facility]   Hospital Discharge Date 03/09/17   Signs and Symptoms Post Hospital Discharge None   Outpatient Cardiac Rehab Start Date 04/13/17   Primary Physician Henrry Dowling   Primary Physician Follow Up Advised to schedule appointment   Surgeon Nick Loredo   Surgeon Follow Up SHAN   Cardiologist Kavita Florentino   Cardiologist Follow Up Scheduled  (May 2017)   Ejection Fraction 50-55%   Risk Stratification High   Summary of Cath Report   Summary of Cath Report No information available   Living and Work Status    Living Arrangements and Social Status house   Support System Live with an adult   Return to  "Employment Retired   Occupation    Preventative Medications   CMS recommended medications Antiplatelets;Lipid Lowering;Anticoagulants;Ace inhibitors;Beta Blocker;Pneumonia vaccination  (anticipate off Coumadin eventually)   Falls Screen   Have you fallen two or more times in the past year? No   Have you fallen and had an injury in the past year? No   Pain   Patient Currently in Pain No   Pain Location    Physical Assessments   Incisions WNL   Edema None   Right Lung Sounds not assessed   Left Lung Sounds not assessed  (Pt recalls MD stating small amount of fluid in L lung)   Limitations Surgical  (sternal)   Individualized Treatment Plan   Monitored Sessions Scheduled 24   Monitored Sessions Attended 14   Oxygen   Supplemental Oxygen needed No   Nutrition Management - Weight Management   Assessment Re-assessment   Age 66   Weight 94 kg (207 lb 3.2 oz)   Height 1.88 m (6' 2.02\")   BMI (Calculated) 26.65   Initial Rate Your Plate Score. Dietary tool to assess eating patterns. Scores range from 24 to 72. The higher the score the healthier the eating pattern. 57   Nutrition Management - Lipids   Lipids Labs Available   Date 09/30/16   Total Cholesterol 226   Triglycerides 235   HDL 42      Prescribed Lipid Medication Yes   Statin Intensity Intensity Not Indicated   Nutrition Management - Diabetes   Diabetes No   Nutrition Management Summary   Dietary Recommendations Low Fat;Low Cholesterol;Low Sodium;Renal  (Pt has gout restrictions)   Stages of Change for Diet Compliance Contemplation   Interventions Planned (none per Pt, wife plans to attend classes.)   Nutrition Summary Comments 5/22 wife has not attended any classes as yet.    Psychosocial Management   Psychosocial Assessment Re-assessment   Is there history of clinical depression or increased risk of depression? History of clinical depression   Current Level of Stress per Patient Report Mild   Current Coping Skills (Paxil, breathing, ETOH, )   Initial " "Patient Health Questionnaire -9 Score (PHQ-9) for depression. 5-9 Minimal symptoms, 10-14 Minor depression, 15-19 Major depression, moderately severe, > 20 Major depression, severe  10   Initial [employer] COOP Survey score.  Quality of Life:   If total score > 25 review individual areas where patient rated a 4 or 5.  Consider patients current medical condition and what role that plays on the score.   Adjust treatment protocol to improve areas of concern.  Consider the following:  PHQ9 score, DASI, and re-assessment within the next 30 days to assist with developing treatments.  30   Stages of Change Maintenance   Interventions Planned Patient to verbalize understanding of behavioral assessment results;Reassess PHQ-9 and/or [place] COOP Surveys if outside of defined limits   Interventions In Progress or Completed Patient verbalizes understanding of behavioral assessment scores   Psychosocial Comments Pt admits to occasional anxiety   Other Core Components - Hypertension   History of or Diagnosis of Hypertension Yes   Currently taking Anti-Hypertensives Yes;Beta blocker;Ace Inhibitor   Hypertension Comments 4/25/17 BP has been WNLs  5/5 Pt is questioning if his BP levels are \"too low for him\" as he continues to have occasional postural hypotension.  Encouraged Pt to call his MD's nurse to discuss. Time spent in consult exploring various causes and mangement strategies. 5/22 BP continues to be wnl, although Pt occasionally still c/o lightheadedness.    Other Core Components - Tobacco   History of Tobacco Use Never  (heavy second hand smoke)   Other Core Components Summary   Patient Goals Yes   Goal #1 Description to establish a home exercise program of 3 times a week for 30-60 minutes.   Goal #1 Target Date 06/13/17   Goal #1 Progress Towards Goal 4/25/17 Patient has not been walking on his off days of rehab, but agreed to start after today.  5/5 Pt walked just once this past week while walking the dog with his wife. " Pt is still in contemplation stage for long term pursuit of an aerobic home program.  5/22 Pt has not exercised at home since the intial attempt. He related that he had felt very tired after that walk, but had also tried to walk briskly.  Reviewed that the initial goal is to increase total time at a normal pace. He is willing to start.  As Pt continued to be very vague, he was assisted to identify Tuesday and Saturday as the most likely to be successful days, with afternoon being the best time. At this point, Pt was not willing to commit to them, but is aware that these days/times will work best for him.  He will report next week how things went.   7/18/17  PT has not been able to attend rehab or exercise/walk outside of rehab due to signficant gout pain.      Activity/Exercise History   Activity/Exercise Assessment Re-assessment   Activity/Exercise Status prior to event? Sedentary   Number of Days Currently participating in Moderate Physical Activity? 1-4   Number of Days Currently performing  Aerobic Exercise (including rehab)? 3  (rehab only)   Number of Minutes per Session Currently of Aerobic Exercise (average)? 35   Current Stage of Change (Physical Activity) Preparation   Current Stage of Change (Aerobic Exercise) Preparation   Patient Goals Goal #1;Goal #2   Goal #1 Description to be able to walk 1 flight of stairs without effort and a second with no significant SOB.   Goal #1 Target Date 06/13/17   Goal #1 Progress Towards Goal 4/25/17 Patient has noticed improvement with SOB, especially while laying down. Patient continues to feel SOB while stair climbing. He will continue to progress towards this goal. 5/5 Pt has been able to climb a flight of stairs continuously, but still mildly SOB.  5/22 Pt is able to walk the half flights in his 4 level home without difficulty.  A full flight still leaves him mildly SOB.  7/18/17  PT still experiencing shortness of breath with 2 flights of stairs.  He has also been  "working with his M.D. as described above regarding this and episodes of lightheadedness.       Goal #2 Description to be able to walk 60 min with moderate pace/ some inclines with stable CV responses.   Goal #2 Target Date 06/13/17   Goal #2 Progress Towards Goal 4/25/17 Reviewed goal with patient. Patient is not currently engaging in home exercise, but will start after today. Patient stated, \"I really need to start doing it. I will start doing it.\" He currently tolerates walking on treadmill for 15 minutes at a peak MET level of 2.5. Patient feels this level is appropriately challenging.   5/5 Pt has been having some aggravation to his diverticulitis, so has backed off a little in exercise. Discussed change in equipment, as well as more time on TM which would assist in his goal. Pt \"will consider it\".  Also confirmed that Pts HR, BP and rhythm have all been wnl.  5/22 Educated Pt re benefits of exercise being maintained only if he continues to exercise post rehab. Pt stated he \"knows this, just has trouble getting going\".  See goal under Core Components.   7/18/17  PT has been limited by gout.  He returns to rehab with intent to progress toward ambulating on TM with incline to assess for ongoing sx of lightheadedness and shortness of breath.      Activity/Exercise Comments Pt is still hesitant about how much he wants to continue with aerobic exercise post rehab.   Exercise Assessment   6 Minute Walk Predicted - Gender Selection Male   6 Minute Walk Predicted (Male) 2040.5   6 Minute Walk Predicted (Female) 1549.16   Initial 6 Minute Walk Distance (Feet) 1378 ft   Resting HR 67 bpm  (3.4)   Exercise HR 88 bpm   Post Exercise HR 78 bpm   Resting /64   Exercise /64   Post Exercise /64   Effort Rating 5   Current MET Level 3.4   MET Level Goal 5-6   ECG Rhythm Normal sinus rhythm   Ectopy PVCs   Current Symptoms Dyspnea   Limitations/Restrictions Sternal Precautions   Exercise Prescription   Mode " Treadmill;Nustep;Airdyne;Weights   Duration/Time 30-45 min   Frequency 3 daysweek   THR (85% of age predicted max HR) 131.75   OMNI Effort Rating (0-10 Scale) 4-6/10   Progression Aerobic exercise to OMNI rating of 6 or below and at or below THR;Progress peak intensity by 1/4 MET per week;Total exercise time of 30-45 minutes   Recommended Home Exercise   Type of Exercise Walking   Frequency (days per week) 2   Duration (minutes per session) 15-30 min  (start with 20 min walk at normal pace)   Effort Rating Recommended 4-6/10   30 Day Exercise Plan Pt will be walking 20-30 continuous min at least 1 day off rehab.   Current Home Exercise   Type of Exercise None   Frequency (days per week)    Follow-up/On-going Support   Provider follow-up needed on the following No follow-up needed   Learning Assessment   Learner Patient;Spouse/Significant Other   Primary Language English   Barriers to Learning Visual  (glasses)   Patient Education   Education recommended Other (see comment)  (Pt declined, wife plans to attend nutrition classes)   Education classes attended Patient Declined/Refused All Education

## 2017-07-19 ENCOUNTER — HOSPITAL ENCOUNTER (OUTPATIENT)
Dept: CARDIAC REHAB | Facility: CLINIC | Age: 67
End: 2017-07-19
Attending: INTERNAL MEDICINE
Payer: MEDICARE

## 2017-07-19 PROCEDURE — 93798 PHYS/QHP OP CAR RHAB W/ECG: CPT | Performed by: OCCUPATIONAL THERAPIST

## 2017-07-19 PROCEDURE — 40000116 ZZH STATISTIC OP CR VISIT: Performed by: OCCUPATIONAL THERAPIST

## 2017-07-26 ENCOUNTER — HOSPITAL ENCOUNTER (OUTPATIENT)
Dept: CARDIAC REHAB | Facility: CLINIC | Age: 67
End: 2017-07-26
Attending: INTERNAL MEDICINE
Payer: MEDICARE

## 2017-07-27 ENCOUNTER — HOSPITAL ENCOUNTER (OUTPATIENT)
Dept: CARDIAC REHAB | Facility: CLINIC | Age: 67
End: 2017-07-27
Attending: INTERNAL MEDICINE
Payer: MEDICARE

## 2017-07-27 PROCEDURE — 40000116 ZZH STATISTIC OP CR VISIT: Performed by: OCCUPATIONAL THERAPIST

## 2017-07-27 PROCEDURE — 93798 PHYS/QHP OP CAR RHAB W/ECG: CPT | Performed by: OCCUPATIONAL THERAPIST

## 2017-07-28 ENCOUNTER — HOSPITAL ENCOUNTER (OUTPATIENT)
Dept: CARDIAC REHAB | Facility: CLINIC | Age: 67
End: 2017-07-28
Attending: INTERNAL MEDICINE
Payer: MEDICARE

## 2017-07-28 PROCEDURE — 93798 PHYS/QHP OP CAR RHAB W/ECG: CPT | Performed by: OCCUPATIONAL THERAPIST

## 2017-07-28 PROCEDURE — 40000116 ZZH STATISTIC OP CR VISIT: Performed by: OCCUPATIONAL THERAPIST

## 2017-07-31 ENCOUNTER — HOSPITAL ENCOUNTER (OUTPATIENT)
Dept: CARDIAC REHAB | Facility: CLINIC | Age: 67
End: 2017-07-31
Attending: INTERNAL MEDICINE
Payer: MEDICARE

## 2017-07-31 VITALS — WEIGHT: 205.8 LBS | HEIGHT: 74 IN | BODY MASS INDEX: 26.41 KG/M2

## 2017-07-31 PROCEDURE — 40000116 ZZH STATISTIC OP CR VISIT: Performed by: REHABILITATION PRACTITIONER

## 2017-07-31 PROCEDURE — 93798 PHYS/QHP OP CAR RHAB W/ECG: CPT | Performed by: REHABILITATION PRACTITIONER

## 2017-07-31 ASSESSMENT — 6 MINUTE WALK TEST (6MWT)
FEMALE CALC: 1553.94
MALE CALC: 2044.18
PREDICTED: 2056.64
TOTAL DISTANCE WALKED (FT): 1378
GENDER SELECTION: MALE

## 2017-07-31 NOTE — PROGRESS NOTES
07/31/17 1100   Session  Simon COLTEN Martines  AVR, CABG   Session 150 Day Individualized Treatment Plan   Certified through this date 09/02/17   Cardiac Rehab Assessment  Physician cosignature/electronic signature indicates approval of this ITP document. I have established, reviewed and made necessary changes to the individualized treatment plan and exercise prescription for this patient.     Cardiac Rehab Assessment Pt will benefit from skilled services for progressive, monitored exercise, targeted education for risk factor control, and behavior change counseling to advance him in risk factor control and home program establishment. 4/25/17 Patient is gradually starting to feel better in his recovery. His SOB is improving, engergy levels are increasing and incisional soreness is lessening. Patient currently tolerates a MET level of 3.1. He will initiate home exercise after today walking on his off days. Patient continues to benefit from skilled therapy to monitor CV response to exercise, to educate on risk management and behavior change to achieve patient's goals.  5/5 Pt was seen for a short private consult. Answered several questions regarding his symptom of occasional lightheadedness, and plans of action. He continues to benefit from skilled services for progressive, monitored exercise, targeted education as issue arise and behavior change counseling regarding long term development of aerobic exercise.  5/22 Pt still has occasional lightheadedness.  His goals were reviewed and update for next ITP period.  He has not yet started a home program, but does feel he is making progress in general activity stamina.  He continues to benefit from skilled services for progressive, monitored exercise, targeted education as it arises and behavior change counseling for his home exercise program.  7/18/17  PT has been gone from rehab due to a signficant bout of gout which caused pain in both feet and decreased weight bearing/exercise  ability.    7/18/17  PT has been gone from rehab due to a bout of gout which caused signficant pain in both of his feet and decreased his exercise/activity tolerance.  He also notes episodes of lightheadedness with activites such as going up steps or climbing hills.  He reports that he is working with his cardiologist regarding this.  He states that he has had a Holter monitor.  He states that he was not given exact results, but states there has been no concern expressed.  He does have noted PVC's which occur occasionally to frequently in rehab.  He states that his M.D. would like BP readings with incline walking on TM with these sx if possible and he would like to work towad this in rehab as gout pain allows.  He continues to benefit from skilled cardiac rehab for monitored exercise progression and education/behavior change counseling related to goals.      General Information   Treatment Diagnosis Valve Replacement  (Aortic)   Date of Treatment Diagnosis 03/01/17   Secondary Treatment Diagnosis Coronary Artery Bypass  (x 1 LIMA)   Significant Past CV History (post op SVT, paroxysmal atrial fibrillation)   Comorbidities Malignancy;Renal Disease  (renal cell carcinoma, s/p nephrectomy)   Other Medical History CKD, HTN, depression.   Lead up symptoms no sx, identified in exam   Hospital Location [facility]   Hospital Discharge Date 03/09/17   Signs and Symptoms Post Hospital Discharge None   Outpatient Cardiac Rehab Start Date 04/13/17   Primary Physician Henrry Dowling   Primary Physician Follow Up Advised to schedule appointment   Surgeon Nick Loredo   Surgeon Follow Up NA   Cardiologist Kavita Florentino   Cardiologist Follow Up Scheduled  (May 2017)   Ejection Fraction 50-55%   Risk Stratification High   Summary of Cath Report   Summary of Cath Report No information available   Living and Work Status    Living Arrangements and Social Status house   Support System Live with an adult   Return to Employment Retired  "  Occupation    Preventative Medications   CMS recommended medications Antiplatelets;Lipid Lowering;Anticoagulants;Ace inhibitors;Beta Blocker;Pneumonia vaccination  (anticipate off Coumadin eventually)   Falls Screen   Have you fallen two or more times in the past year? No   Have you fallen and had an injury in the past year? No   Pain   Patient Currently in Pain No   Pain Location    Physical Assessments   Incisions Not assessed   Edema Not assessed   Right Lung Sounds not assessed   Left Lung Sounds not assessed  (Pt recalls MD stating small amount of fluid in L lung)   Limitations Surgical  (sternal)   Individualized Treatment Plan   Monitored Sessions Scheduled 36   Monitored Sessions Attended 25   Oxygen   Supplemental Oxygen needed No   Nutrition Management - Weight Management   Assessment Re-assessment   Age 66   Weight 93.4 kg (205 lb 12.8 oz)   Height 1.88 m (6' 2.02\")   BMI (Calculated) 26.47   Initial Rate Your Plate Score. Dietary tool to assess eating patterns. Scores range from 24 to 72. The higher the score the healthier the eating pattern. 57   Nutrition Management - Lipids   Lipids Labs Available   Date 09/30/16   Total Cholesterol 226   Triglycerides 235   HDL 42      Prescribed Lipid Medication Yes   Statin Intensity Intensity Not Indicated   Nutrition Management - Diabetes   Diabetes No   Nutrition Management Summary   Dietary Recommendations Low Fat;Low Cholesterol;Low Sodium;Renal  (Pt has gout restrictions)   Stages of Change for Diet Compliance Contemplation   Interventions Planned (none per Pt, wife plans to attend classes.)   Nutrition Summary Comments 5/22 wife has not attended any classes as yet.    Psychosocial Management   Psychosocial Assessment Re-assessment   Is there history of clinical depression or increased risk of depression? History of clinical depression   Current Level of Stress per Patient Report Mild   Current Coping Skills (Paxil, breathing, ETOH, )   Initial " "Patient Health Questionnaire -9 Score (PHQ-9) for depression. 5-9 Minimal symptoms, 10-14 Minor depression, 15-19 Major depression, moderately severe, > 20 Major depression, severe  10   Initial [employer] COOP Survey score.  Quality of Life:   If total score > 25 review individual areas where patient rated a 4 or 5.  Consider patients current medical condition and what role that plays on the score.   Adjust treatment protocol to improve areas of concern.  Consider the following:  PHQ9 score, DASI, and re-assessment within the next 30 days to assist with developing treatments.  30   Stages of Change Maintenance   Interventions Planned Patient to verbalize understanding of behavioral assessment results;Reassess PHQ-9 and/or [place] COOP Surveys if outside of defined limits   Interventions In Progress or Completed Patient verbalizes understanding of behavioral assessment scores   Psychosocial Comments Pt admits to occasional anxiety   Other Core Components - Hypertension   History of or Diagnosis of Hypertension Yes   Currently taking Anti-Hypertensives Yes;Beta blocker;Ace Inhibitor   Hypertension Comments 4/25/17 BP has been WNLs  5/5 Pt is questioning if his BP levels are \"too low for him\" as he continues to have occasional postural hypotension.  Encouraged Pt to call his MD's nurse to discuss. Time spent in consult exploring various causes and mangement strategies. 5/22 BP continues to be wnl, although Pt occasionally still c/o lightheadedness. 7/31/17 Pt WNL's. Experiences lightheadness at peak levels (3.9METs).   Other Core Components - Tobacco   History of Tobacco Use Never  (heavy second hand smoke)   Other Core Components Summary   Patient Goals Yes   Goal #1 Description to establish a home exercise program of 3 times a week for 30-60 minutes.   Goal #1 Target Date 06/13/17   Goal #1 Progress Towards Goal 4/25/17 Patient has not been walking on his off days of rehab, but agreed to start after today.  5/5 Pt " walked just once this past week while walking the dog with his wife. Pt is still in contemplation stage for long term pursuit of an aerobic home program.  5/22 Pt has not exercised at home since the intial attempt. He related that he had felt very tired after that walk, but had also tried to walk briskly.  Reviewed that the initial goal is to increase total time at a normal pace. He is willing to start.  As Pt continued to be very vague, he was assisted to identify Tuesday and Saturday as the most likely to be successful days, with afternoon being the best time. At this point, Pt was not willing to commit to them, but is aware that these days/times will work best for him.  He will report next week how things went.   7/18/17  PT has not been able to attend rehab or exercise/walk outside of rehab due to signficant gout pain.   7/31/17 Pt is walking 2-3x per week outside of CR for 30minutes at a similar pace to CR levels (2.5mph). Pt has access to [employer] but has not gone with his wife yet.   Activity/Exercise History   Activity/Exercise Assessment Re-assessment   Activity/Exercise Status prior to event? Sedentary   Number of Days Currently participating in Moderate Physical Activity? 1-4   Number of Days Currently performing  Aerobic Exercise (including rehab)? 3  (rehab only)   Number of Minutes per Session Currently of Aerobic Exercise (average)? 35   Current Stage of Change (Physical Activity) Preparation   Current Stage of Change (Aerobic Exercise) Preparation   Patient Goals Goal #1;Goal #2   Goal #1 Description to be able to walk 1 flight of stairs without effort and a second with no significant SOB.   Goal #1 Target Date 06/13/17   Goal #1 Progress Towards Goal 4/25/17 Patient has noticed improvement with SOB, especially while laying down. Patient continues to feel SOB while stair climbing. He will continue to progress towards this goal. 5/5 Pt has been able to climb a flight of stairs continuously,  "but still mildly SOB.  5/22 Pt is able to walk the half flights in his 4 level home without difficulty.  A full flight still leaves him mildly SOB.  7/18/17  PT still experiencing shortness of breath with 2 flights of stairs.  He has also been working with his M.D. as described above regarding this and episodes of lightheadedness.    7/31/17 Pt has limited SOB from walking 1 flight of stairs. Pt states its a \"normal\" amount of SOB but unsure about walking more than one flight.   Goal #2 Description to be able to walk 60 min with moderate pace/ some inclines with stable CV responses.   Goal #2 Target Date 06/13/17   Goal #2 Progress Towards Goal 4/25/17 Reviewed goal with patient. Patient is not currently engaging in home exercise, but will start after today. Patient stated, \"I really need to start doing it. I will start doing it.\" He currently tolerates walking on treadmill for 15 minutes at a peak MET level of 2.5. Patient feels this level is appropriately challenging.   5/5 Pt has been having some aggravation to his diverticulitis, so has backed off a little in exercise. Discussed change in equipment, as well as more time on TM which would assist in his goal. Pt \"will consider it\".  Also confirmed that Pts HR, BP and rhythm have all been wnl.  5/22 Educated Pt re benefits of exercise being maintained only if he continues to exercise post rehab. Pt stated he \"knows this, just has trouble getting going\".  See goal under Core Components.   7/18/17  PT has been limited by gout.  He returns to rehab with intent to progress toward ambulating on TM with incline to assess for ongoing sx of lightheadedness and shortness of breath.   7/31/17 Pt is walking for 15 minutes at rehab at 2.6mph and 2.5% grade. Pt tried 1 bout of 5% grade to mimic hills he wants to get back to walking.   Activity/Exercise Comments Pt is still hesitant about how much he wants to continue with aerobic exercise post rehab.   Exercise Assessment   6 " Minute Walk Predicted - Gender Selection Male   6 Minute Walk Predicted (Male) 2044.18   6 Minute Walk Predicted (Female) 1553.94   Initial 6 Minute Walk Distance (Feet) 1378 ft   Resting HR 76 bpm  (3.4)   Exercise HR 91 bpm   Post Exercise HR 77 bpm   Resting /78   Exercise /72   Post Exercise /70   Effort Rating 5   Current MET Level 3.9   MET Level Goal 5-6   ECG Rhythm Normal sinus rhythm   Ectopy PVCs   Current Symptoms Lightheadedness   Limitations/Restrictions Sternal Precautions   Exercise Prescription   Mode Treadmill;Nustep;Weights   Duration/Time 30-45 min   Frequency 3 daysweek   THR (85% of age predicted max HR) 131.75   OMNI Effort Rating (0-10 Scale) 4-6/10   Progression Aerobic exercise to OMNI rating of 6 or below and at or below THR;Progress peak intensity by 1/4 MET per week;Total exercise time of 30-45 minutes   Recommended Home Exercise   Type of Exercise Walking   Frequency (days per week) 2-3   Duration (minutes per session) 15-30 min  (start with 20 min walk at normal pace)   Effort Rating Recommended 4-6/10   30 Day Exercise Plan Pt will be walking 20-30 continuous min at least 2 days off rehab. Attend [employer].   Current Home Exercise   Type of Exercise Walking   Frequency (days per week) 2-3x   Duration (minutes per session) 30   Follow-up/On-going Support   Provider follow-up needed on the following No follow-up needed   Learning Assessment   Learner Patient;Spouse/Significant Other   Primary Language English   Barriers to Learning Visual  (glasses)   Patient Education   Education recommended Other (see comment)  (Pt declined, wife plans to attend nutrition classes)   Education classes attended Patient Declined/Refused All Education

## 2017-08-02 ENCOUNTER — HOSPITAL ENCOUNTER (OUTPATIENT)
Dept: CARDIAC REHAB | Facility: CLINIC | Age: 67
End: 2017-08-02
Attending: INTERNAL MEDICINE
Payer: MEDICARE

## 2017-08-02 PROCEDURE — 93798 PHYS/QHP OP CAR RHAB W/ECG: CPT | Performed by: OCCUPATIONAL THERAPIST

## 2017-08-02 PROCEDURE — 40000116 ZZH STATISTIC OP CR VISIT: Performed by: OCCUPATIONAL THERAPIST

## 2017-08-09 ENCOUNTER — HOSPITAL ENCOUNTER (OUTPATIENT)
Dept: CARDIAC REHAB | Facility: CLINIC | Age: 67
End: 2017-08-09
Attending: INTERNAL MEDICINE
Payer: MEDICARE

## 2017-08-09 PROCEDURE — 40000116 ZZH STATISTIC OP CR VISIT: Performed by: OCCUPATIONAL THERAPIST

## 2017-08-09 PROCEDURE — 93798 PHYS/QHP OP CAR RHAB W/ECG: CPT | Performed by: OCCUPATIONAL THERAPIST

## 2017-08-11 ENCOUNTER — HOSPITAL ENCOUNTER (OUTPATIENT)
Dept: CARDIAC REHAB | Facility: CLINIC | Age: 67
End: 2017-08-11
Attending: INTERNAL MEDICINE
Payer: MEDICARE

## 2017-08-11 PROCEDURE — 40000116 ZZH STATISTIC OP CR VISIT: Performed by: OCCUPATIONAL THERAPIST

## 2017-08-11 PROCEDURE — 93798 PHYS/QHP OP CAR RHAB W/ECG: CPT | Performed by: OCCUPATIONAL THERAPIST

## 2017-08-14 ENCOUNTER — HOSPITAL ENCOUNTER (OUTPATIENT)
Dept: CARDIAC REHAB | Facility: CLINIC | Age: 67
End: 2017-08-14
Attending: INTERNAL MEDICINE
Payer: MEDICARE

## 2017-08-14 PROCEDURE — 93798 PHYS/QHP OP CAR RHAB W/ECG: CPT | Performed by: OCCUPATIONAL THERAPIST

## 2017-08-14 PROCEDURE — 40000116 ZZH STATISTIC OP CR VISIT: Performed by: OCCUPATIONAL THERAPIST

## 2017-08-15 ENCOUNTER — HOSPITAL ENCOUNTER (OUTPATIENT)
Dept: CARDIAC REHAB | Facility: CLINIC | Age: 67
End: 2017-08-15
Attending: INTERNAL MEDICINE
Payer: MEDICARE

## 2017-08-15 VITALS — HEIGHT: 74 IN | BODY MASS INDEX: 26.98 KG/M2 | WEIGHT: 210.2 LBS

## 2017-08-15 PROCEDURE — 40000116 ZZH STATISTIC OP CR VISIT: Performed by: OCCUPATIONAL THERAPIST

## 2017-08-15 PROCEDURE — 93798 PHYS/QHP OP CAR RHAB W/ECG: CPT | Performed by: OCCUPATIONAL THERAPIST

## 2017-08-15 ASSESSMENT — 6 MINUTE WALK TEST (6MWT)
GENDER SELECTION: MALE
FEMALE CALC: 1538.92
PREDICTED: 2045.03
TOTAL DISTANCE WALKED (FT): 1378
MALE CALC: 2032.63

## 2017-08-15 NOTE — PROGRESS NOTES
08/15/17 1408   Session  Simon Martines  Dx:  Aortic Valve Replacement   Session Other (comments)  (180 day ITP)   Certified through this date 09/23/17   Physician cosignature/electronic signature indicates approval of this ITP document. I have established, reviewed and made necessary changes to the individualized treatment plan and exercise prescription for this patient.    Cardiac Rehab Assessment   Cardiac Rehab Assessment 5/5 Pt was seen for a short private consult. Answered several questions regarding his symptom of occasional lightheadedness, and plans of action. He continues to benefit from skilled services for progressive, monitored exercise, targeted education as issue arise and behavior change counseling regarding long term development of aerobic exercise.  5/22 Pt still has occasional lightheadedness.  His goals were reviewed and update for next ITP period.  He has not yet started a home program, but does feel he is making progress in general activity stamina.  He continues to benefit from skilled services for progressive, monitored exercise, targeted education as it arises and behavior change counseling for his home exercise program.  7/18/17  PT has been gone from rehab due to a signficant bout of gout which caused pain in both feet and decreased weight bearing/exercise ability.    7/18/17  PT has been gone from rehab due to a bout of gout which caused signficant pain in both of his feet and decreased his exercise/activity tolerance.  He also notes episodes of lightheadedness with activites such as going up steps or climbing hills.  He reports that he is working with his cardiologist regarding this.  He states that he has had a Holter monitor.  He states that he was not given exact results, but states there has been no concern expressed.  He does have noted PVC's which occur occasionally to frequently in rehab.  He states that his M.D. would like BP readings with incline walking on TM with these sx if  possible and he would like to work towad this in rehab as gout pain allows.  He continues to benefit from skilled cardiac rehab for monitored exercise progression and education/behavior change counseling related to goals.   8/15/17  This update focus was on BP readings and the fact that PT's weight is up about 4.5 lbs in the last week and a half.  He denies sx associated with this but was advised of signs/sx of increased fluid retention and action plan.  He declined to address goals specifically and states that he is contemplating completing rehab soon.  He has an upcoming appt with his cardiologist to address BP and will discuss rehab plan at that time also.  Left message for PT's M.D. regarding cv responses and advised PT of benefits of continued rehab until BP resolved.  He continues to benefit from skilled cardiac rehab for monitored exercise progression and education/behavior change counseling related to optimal BP control and home exercise.   General Information   Treatment Diagnosis Valve Replacement  (Aortic)   Date of Treatment Diagnosis 03/01/17   Secondary Treatment Diagnosis Coronary Artery Bypass  (x 1 LIMA)   Significant Past CV History (post op SVT, paroxysmal atrial fibrillation)   Comorbidities Malignancy;Renal Disease  (renal cell carcinoma, s/p nephrectomy)   Other Medical History CKD, HTN, depression.   Lead up symptoms no sx, identified in exam   Hospital Location [facility]   Hospital Discharge Date 03/09/17   Signs and Symptoms Post Hospital Discharge None   Outpatient Cardiac Rehab Start Date 04/13/17   Primary Physician Henrry Dowling   Primary Physician Follow Up Advised to schedule appointment   Surgeon Nick Loredo   Surgeon Follow Up NA   Cardiologist Kavita Florentino   Cardiologist Follow Up Scheduled  (May 2017)   Ejection Fraction 50-55%   Risk Stratification High   Summary of Cath Report   Summary of Cath Report No information available   Living and Work Status    Living Arrangements and  "Social Status house   Support System Live with an adult   Return to Employment Retired   Occupation    Preventative Medications   CMS recommended medications Antiplatelets;Lipid Lowering;Anticoagulants;Ace inhibitors;Beta Blocker;Pneumonia vaccination  (anticipate off Coumadin eventually)   Falls Screen   Have you fallen two or more times in the past year? No   Have you fallen and had an injury in the past year? No   Pain   Patient Currently in Pain No   Pain Location    Physical Assessments   Incisions Not assessed   Edema Not assessed   Right Lung Sounds not assessed   Left Lung Sounds not assessed  (Pt recalls MD stating small amount of fluid in L lung)   Limitations Surgical  (sternal)   Individualized Treatment Plan   Monitored Sessions Scheduled 36   Monitored Sessions Attended 30   Oxygen   Supplemental Oxygen needed No   Nutrition Management - Weight Management   Assessment Re-assessment   Age 66   Weight 95.3 kg (210 lb 3.2 oz)   Height 1.88 m (6' 2.02\")   BMI (Calculated) 27.03   Initial Rate Your Plate Score. Dietary tool to assess eating patterns. Scores range from 24 to 72. The higher the score the healthier the eating pattern. 57   Nutrition Management - Lipids   Lipids Labs Available   Date 09/30/16   Total Cholesterol 226   Triglycerides 235   HDL 42      Prescribed Lipid Medication Yes   Statin Intensity Intensity Not Indicated   Nutrition Management - Diabetes   Diabetes No   Nutrition Management Summary   Dietary Recommendations Low Fat;Low Cholesterol;Low Sodium;Renal  (Pt has gout restrictions)   Stages of Change for Diet Compliance Contemplation   Interventions Planned (none per Pt, wife plans to attend classes.)   Nutrition Summary Comments 5/22 wife has not attended any classes as yet.   8/15/17  no change.   Psychosocial Management   Psychosocial Assessment Re-assessment   Is there history of clinical depression or increased risk of depression? History of clinical depression " "  Current Level of Stress per Patient Report Mild   Current Coping Skills (Paxil, breathing, ETOH, )   Initial Patient Health Questionnaire -9 Score (PHQ-9) for depression. 5-9 Minimal symptoms, 10-14 Minor depression, 15-19 Major depression, moderately severe, > 20 Major depression, severe  10   Initial [place] COOP Survey score.  Quality of Life:   If total score > 25 review individual areas where patient rated a 4 or 5.  Consider patients current medical condition and what role that plays on the score.   Adjust treatment protocol to improve areas of concern.  Consider the following:  PHQ9 score, DASI, and re-assessment within the next 30 days to assist with developing treatments.  30   Stages of Change Maintenance   Interventions Planned Patient to verbalize understanding of behavioral assessment results;Reassess PHQ-9 and/or [place] COOP Surveys if outside of defined limits   Interventions In Progress or Completed Patient verbalizes understanding of behavioral assessment scores   Psychosocial Comments Pt admits to occasional anxiety   Other Core Components - Hypertension   History of or Diagnosis of Hypertension Yes   Currently taking Anti-Hypertensives Yes;Beta blocker;Ace Inhibitor   Hypertension Comments 4/25/17 BP has been WNLs  5/5 Pt is questioning if his BP levels are \"too low for him\" as he continues to have occasional postural hypotension.  Encouraged Pt to call his MD's nurse to discuss. Time spent in consult exploring various causes and mangement strategies. 5/22 BP continues to be wnl, although Pt occasionally still c/o lightheadedness. 7/31/17 Pt WNL's. Experiences lightheadness at peak levels (3.9METs).   Other Core Components - Tobacco   History of Tobacco Use Never  (heavy second hand smoke)   Other Core Components Summary   Patient Goals Yes   Goal #1 Description to establish a home exercise program of 3 times a week for 30-60 minutes.   Goal #1 Target Date 06/13/17   Goal #1 Progress " Towards Goal 4/25/17 Patient has not been walking on his off days of rehab, but agreed to start after today.  5/5 Pt walked just once this past week while walking the dog with his wife. Pt is still in contemplation stage for long term pursuit of an aerobic home program.  5/22 Pt has not exercised at home since the intial attempt. He related that he had felt very tired after that walk, but had also tried to walk briskly.  Reviewed that the initial goal is to increase total time at a normal pace. He is willing to start.  As Pt continued to be very vague, he was assisted to identify Tuesday and Saturday as the most likely to be successful days, with afternoon being the best time. At this point, Pt was not willing to commit to them, but is aware that these days/times will work best for him.  He will report next week how things went.   7/18/17  PT has not been able to attend rehab or exercise/walk outside of rehab due to signficant gout pain.   7/31/17 Pt is walking 2-3x per week outside of CR for 30minutes at a similar pace to CR levels (2.5mph). Pt has access to [employer] but has not gone with his wife yet.  8/15/17  PT has been walking 30-60 minutes outside of rehab with reportedly good tolerance.  Discussed the benefits of continued exercise to maintain gains made post surgery and continue recovery process.   Activity/Exercise History   Activity/Exercise Assessment Re-assessment   Activity/Exercise Status prior to event? Sedentary   Number of Days Currently participating in Moderate Physical Activity? 1-4   Number of Days Currently performing  Aerobic Exercise (including rehab)? 3  (rehab only)   Number of Minutes per Session Currently of Aerobic Exercise (average)? 35   Current Stage of Change (Physical Activity) Preparation   Current Stage of Change (Aerobic Exercise) Preparation   Patient Goals Goal #1;Goal #2   Goal #1 Description to be able to walk 1 flight of stairs without effort and a second with no  "significant SOB.   Goal #1 Target Date 06/13/17   Goal #1 Progress Towards Goal 4/25/17 Patient has noticed improvement with SOB, especially while laying down. Patient continues to feel SOB while stair climbing. He will continue to progress towards this goal. 5/5 Pt has been able to climb a flight of stairs continuously, but still mildly SOB.  5/22 Pt is able to walk the half flights in his 4 level home without difficulty.  A full flight still leaves him mildly SOB.  7/18/17  PT still experiencing shortness of breath with 2 flights of stairs.  He has also been working with his M.D. as described above regarding this and episodes of lightheadedness.    7/31/17 Pt has limited SOB from walking 1 flight of stairs. Pt states its a \"normal\" amount of SOB but unsure about walking more than one flight.  8/15/17  PT declined to address this goal.  See assessment section above.   Goal #2 Description to be able to walk 60 min with moderate pace/ some inclines with stable CV responses.   Goal #2 Target Date 06/13/17   Goal #2 Progress Towards Goal 4/25/17 Reviewed goal with patient. Patient is not currently engaging in home exercise, but will start after today. Patient stated, \"I really need to start doing it. I will start doing it.\" He currently tolerates walking on treadmill for 15 minutes at a peak MET level of 2.5. Patient feels this level is appropriately challenging.   5/5 Pt has been having some aggravation to his diverticulitis, so has backed off a little in exercise. Discussed change in equipment, as well as more time on TM which would assist in his goal. Pt \"will consider it\".  Also confirmed that Pts HR, BP and rhythm have all been wnl.  5/22 Educated Pt re benefits of exercise being maintained only if he continues to exercise post rehab. Pt stated he \"knows this, just has trouble getting going\".  See goal under Core Components.   7/18/17  PT has been limited by gout.  He returns to rehab with intent to progress " toward ambulating on TM with incline to assess for ongoing sx of lightheadedness and shortness of breath.   7/31/17 Pt is walking for 15 minutes at rehab at 2.6mph and 2.5% grade. Pt tried 1 bout of 5% grade to mimic hills he wants to get back to walking.  8/15/17  PT currently walking at 2.6 mph/2.5% in rehab to simulate walking hills.  He is tolerating this well without sx.  He has also een walking 30-50 minutes consistently on days off rehab.    Activity/Exercise Comments Pt is still hesitant about how much he wants to continue with aerobic exercise post rehab.   Exercise Assessment   6 Minute Walk Predicted - Gender Selection Male   6 Minute Walk Predicted (Male) 2032.63   6 Minute Walk Predicted (Female) 1538.92   Initial 6 Minute Walk Distance (Feet) 1378 ft   Resting HR 68 bpm  (3.4)   Exercise HR 87 bpm   Post Exercise HR 77 bpm   Resting /82   Exercise /68   Post Exercise /82   Effort Rating 5   Current MET Level 3.9   MET Level Goal 5-6   ECG Rhythm Normal sinus rhythm   Ectopy PVCs   Current Symptoms Lightheadedness   Limitations/Restrictions Sternal Precautions   Exercise Prescription   Mode Treadmill;Nustep;Weights   Duration/Time 30-45 min   Frequency 3 daysweek   THR (85% of age predicted max HR) 131.75   OMNI Effort Rating (0-10 Scale) 4-6/10   Progression Aerobic exercise to OMNI rating of 6 or below and at or below THR;Progress peak intensity by 1/4 MET per week;Total exercise time of 30-45 minutes   Recommended Home Exercise   Type of Exercise Walking   Frequency (days per week) 2-4   Duration (minutes per session) 30-45 min  (start with 20 min walk at normal pace)   Effort Rating Recommended 4-6/10   30 Day Exercise Plan Pt will be walking 20-30 continuous min at least 2 days off rehab. Attend [employer].  8/15/17  See goal above   Current Home Exercise   Type of Exercise Walking   Frequency (days per week) 2-3x   Duration (minutes per session) 30   Follow-up/On-going  Support   Provider follow-up needed on the following No follow-up needed   Comments 8/15/17  PT has not attended education classes.   Learning Assessment   Learner Patient;Spouse/Significant Other   Primary Language English   Barriers to Learning Visual  (glasses)   Patient Education   Education recommended Other (see comment)  (Pt declined, wife plans to attend nutrition classes)   Education classes attended Patient Declined/Refused All Education

## 2017-11-13 NOTE — PROGRESS NOTES
Cardiac Rehab Discharge Summary    Reason for discharge:    Patient/family request discontinuation of services.    Progress towards goals:  Goals met  Goals partially met.  Barriers to achieving goals:   Patient attended 31 sessions but declined a formal discharge session.    Recommendation(s):    Continue home exercise program.     Physician cosignature/electronic signature indicates approval of this ITP document. I have established, reviewed and made necessary changes to the individualized treatment plan and exercise prescription for this patient.

## 2017-11-13 NOTE — ADDENDUM NOTE
Encounter addended by: Chuck Sanches EP on: 11/13/2017  4:10 PM<BR>     Actions taken: Sign clinical note

## 2020-02-17 ENCOUNTER — ANESTHESIA EVENT (OUTPATIENT)
Dept: SURGERY | Facility: CLINIC | Age: 70
DRG: 331 | End: 2020-02-17
Payer: COMMERCIAL

## 2020-02-17 RX ORDER — TERAZOSIN 5 MG/1
5 CAPSULE ORAL
COMMUNITY

## 2020-02-17 RX ORDER — WARFARIN SODIUM 1 MG/1
3-4 TABLET ORAL DAILY
COMMUNITY

## 2020-02-17 RX ORDER — METOPROLOL SUCCINATE 100 MG/1
100 TABLET, EXTENDED RELEASE ORAL DAILY
COMMUNITY

## 2020-02-17 RX ORDER — LEVOFLOXACIN 500 MG/1
500 TABLET, FILM COATED ORAL DAILY
Status: ON HOLD | COMMUNITY
End: 2020-02-21

## 2020-02-17 RX ORDER — ACETAMINOPHEN 500 MG
500 TABLET ORAL 2 TIMES DAILY PRN
COMMUNITY

## 2020-02-17 RX ORDER — FUROSEMIDE 40 MG
40 TABLET ORAL DAILY
Status: ON HOLD | COMMUNITY
End: 2022-11-28

## 2020-02-17 RX ORDER — DIPHENHYDRAMINE HCL 25 MG
25 TABLET ORAL DAILY PRN
COMMUNITY

## 2020-02-17 RX ORDER — METRONIDAZOLE 250 MG/1
250 TABLET ORAL 3 TIMES DAILY
Status: ON HOLD | COMMUNITY
End: 2020-02-21

## 2020-02-17 RX ORDER — ATORVASTATIN CALCIUM 40 MG/1
40 TABLET, FILM COATED ORAL AT BEDTIME
COMMUNITY

## 2020-02-17 ASSESSMENT — ENCOUNTER SYMPTOMS: DYSRHYTHMIAS: 1

## 2020-02-17 ASSESSMENT — LIFESTYLE VARIABLES: TOBACCO_USE: 0

## 2020-02-17 ASSESSMENT — COPD QUESTIONNAIRES: COPD: 0

## 2020-02-18 ENCOUNTER — HOSPITAL ENCOUNTER (INPATIENT)
Facility: CLINIC | Age: 70
LOS: 3 days | Discharge: HOME | DRG: 331 | End: 2020-02-21
Attending: COLON & RECTAL SURGERY | Admitting: COLON & RECTAL SURGERY
Payer: COMMERCIAL

## 2020-02-18 ENCOUNTER — ANESTHESIA (OUTPATIENT)
Dept: SURGERY | Facility: CLINIC | Age: 70
DRG: 331 | End: 2020-02-18
Payer: COMMERCIAL

## 2020-02-18 DIAGNOSIS — K57.92 DIVERTICULITIS: Primary | ICD-10-CM

## 2020-02-18 LAB
ABO + RH BLD: NORMAL
ABO + RH BLD: NORMAL
BLD GP AB SCN SERPL QL: NORMAL
BLOOD BANK CMNT PATIENT-IMP: NORMAL
CREAT SERPL-MCNC: 1.53 MG/DL (ref 0.66–1.25)
GFR SERPL CREATININE-BSD FRML MDRD: 46 ML/MIN/{1.73_M2}
HGB BLD-MCNC: 13.5 G/DL (ref 13.3–17.7)
INR PPP: 1.4 (ref 0.86–1.14)
PLATELET # BLD AUTO: 146 10E9/L (ref 150–450)
POTASSIUM SERPL-SCNC: 3.4 MMOL/L (ref 3.4–5.3)
SPECIMEN EXP DATE BLD: NORMAL

## 2020-02-18 PROCEDURE — 12000000 ZZH R&B MED SURG/OB

## 2020-02-18 PROCEDURE — 25000128 H RX IP 250 OP 636: Performed by: NURSE ANESTHETIST, CERTIFIED REGISTERED

## 2020-02-18 PROCEDURE — 25800030 ZZH RX IP 258 OP 636: Performed by: COLON & RECTAL SURGERY

## 2020-02-18 PROCEDURE — 36000087 ZZH SURGERY LEVEL 8 EA 15 ADDTL MIN: Performed by: COLON & RECTAL SURGERY

## 2020-02-18 PROCEDURE — 25000128 H RX IP 250 OP 636: Performed by: ANESTHESIOLOGY

## 2020-02-18 PROCEDURE — 86850 RBC ANTIBODY SCREEN: CPT | Performed by: COLON & RECTAL SURGERY

## 2020-02-18 PROCEDURE — 25000125 ZZHC RX 250: Performed by: NURSE ANESTHETIST, CERTIFIED REGISTERED

## 2020-02-18 PROCEDURE — 82565 ASSAY OF CREATININE: CPT | Performed by: COLON & RECTAL SURGERY

## 2020-02-18 PROCEDURE — 0DJD8ZZ INSPECTION OF LOWER INTESTINAL TRACT, VIA NATURAL OR ARTIFICIAL OPENING ENDOSCOPIC: ICD-10-PCS | Performed by: COLON & RECTAL SURGERY

## 2020-02-18 PROCEDURE — 25800030 ZZH RX IP 258 OP 636: Performed by: ANESTHESIOLOGY

## 2020-02-18 PROCEDURE — 27210794 ZZH OR GENERAL SUPPLY STERILE: Performed by: COLON & RECTAL SURGERY

## 2020-02-18 PROCEDURE — 82565 ASSAY OF CREATININE: CPT | Performed by: ANESTHESIOLOGY

## 2020-02-18 PROCEDURE — 88307 TISSUE EXAM BY PATHOLOGIST: CPT | Performed by: COLON & RECTAL SURGERY

## 2020-02-18 PROCEDURE — 52005 CYSTO W/URTRL CATHJ: CPT | Performed by: UROLOGY

## 2020-02-18 PROCEDURE — 37000008 ZZH ANESTHESIA TECHNICAL FEE, 1ST 30 MIN: Performed by: COLON & RECTAL SURGERY

## 2020-02-18 PROCEDURE — 85610 PROTHROMBIN TIME: CPT | Performed by: ANESTHESIOLOGY

## 2020-02-18 PROCEDURE — 86901 BLOOD TYPING SEROLOGIC RH(D): CPT | Performed by: COLON & RECTAL SURGERY

## 2020-02-18 PROCEDURE — 71000012 ZZH RECOVERY PHASE 1 LEVEL 1 FIRST HR: Performed by: COLON & RECTAL SURGERY

## 2020-02-18 PROCEDURE — 0DTN0ZZ RESECTION OF SIGMOID COLON, OPEN APPROACH: ICD-10-PCS | Performed by: COLON & RECTAL SURGERY

## 2020-02-18 PROCEDURE — 25000566 ZZH SEVOFLURANE, EA 15 MIN: Performed by: COLON & RECTAL SURGERY

## 2020-02-18 PROCEDURE — 36000085 ZZH SURGERY LEVEL 8 1ST 30 MIN: Performed by: COLON & RECTAL SURGERY

## 2020-02-18 PROCEDURE — 85018 HEMOGLOBIN: CPT | Performed by: ANESTHESIOLOGY

## 2020-02-18 PROCEDURE — 25800030 ZZH RX IP 258 OP 636: Performed by: NURSE ANESTHETIST, CERTIFIED REGISTERED

## 2020-02-18 PROCEDURE — 25000128 H RX IP 250 OP 636: Performed by: COLON & RECTAL SURGERY

## 2020-02-18 PROCEDURE — 88307 TISSUE EXAM BY PATHOLOGIST: CPT | Mod: 26 | Performed by: COLON & RECTAL SURGERY

## 2020-02-18 PROCEDURE — 25800025 ZZH RX 258: Performed by: COLON & RECTAL SURGERY

## 2020-02-18 PROCEDURE — P9041 ALBUMIN (HUMAN),5%, 50ML: HCPCS | Performed by: NURSE ANESTHETIST, CERTIFIED REGISTERED

## 2020-02-18 PROCEDURE — 37000009 ZZH ANESTHESIA TECHNICAL FEE, EACH ADDTL 15 MIN: Performed by: COLON & RECTAL SURGERY

## 2020-02-18 PROCEDURE — 36415 COLL VENOUS BLD VENIPUNCTURE: CPT | Performed by: COLON & RECTAL SURGERY

## 2020-02-18 PROCEDURE — 25000125 ZZHC RX 250: Performed by: COLON & RECTAL SURGERY

## 2020-02-18 PROCEDURE — 8E0W4CZ ROBOTIC ASSISTED PROCEDURE OF TRUNK REGION, PERCUTANEOUS ENDOSCOPIC APPROACH: ICD-10-PCS | Performed by: COLON & RECTAL SURGERY

## 2020-02-18 PROCEDURE — 85049 AUTOMATED PLATELET COUNT: CPT | Performed by: ANESTHESIOLOGY

## 2020-02-18 PROCEDURE — 4A1BXSH MONITORING OF GASTROINTESTINAL VASCULAR PERFUSION USING INDOCYANINE GREEN DYE, EXTERNAL APPROACH: ICD-10-PCS | Performed by: COLON & RECTAL SURGERY

## 2020-02-18 PROCEDURE — 40000170 ZZH STATISTIC PRE-PROCEDURE ASSESSMENT II: Performed by: COLON & RECTAL SURGERY

## 2020-02-18 PROCEDURE — 71000013 ZZH RECOVERY PHASE 1 LEVEL 1 EA ADDTL HR: Performed by: COLON & RECTAL SURGERY

## 2020-02-18 PROCEDURE — 25000132 ZZH RX MED GY IP 250 OP 250 PS 637: Performed by: COLON & RECTAL SURGERY

## 2020-02-18 PROCEDURE — 84132 ASSAY OF SERUM POTASSIUM: CPT | Performed by: ANESTHESIOLOGY

## 2020-02-18 PROCEDURE — 86900 BLOOD TYPING SEROLOGIC ABO: CPT | Performed by: COLON & RECTAL SURGERY

## 2020-02-18 RX ORDER — DEXAMETHASONE SODIUM PHOSPHATE 4 MG/ML
INJECTION, SOLUTION INTRA-ARTICULAR; INTRALESIONAL; INTRAMUSCULAR; INTRAVENOUS; SOFT TISSUE PRN
Status: DISCONTINUED | OUTPATIENT
Start: 2020-02-18 | End: 2020-02-18

## 2020-02-18 RX ORDER — SODIUM CHLORIDE, SODIUM LACTATE, POTASSIUM CHLORIDE, CALCIUM CHLORIDE 600; 310; 30; 20 MG/100ML; MG/100ML; MG/100ML; MG/100ML
INJECTION, SOLUTION INTRAVENOUS CONTINUOUS
Status: DISCONTINUED | OUTPATIENT
Start: 2020-02-18 | End: 2020-02-18 | Stop reason: HOSPADM

## 2020-02-18 RX ORDER — DIPHENHYDRAMINE HYDROCHLORIDE 50 MG/ML
12.5 INJECTION INTRAMUSCULAR; INTRAVENOUS EVERY 6 HOURS PRN
Status: DISCONTINUED | OUTPATIENT
Start: 2020-02-18 | End: 2020-02-21 | Stop reason: HOSPADM

## 2020-02-18 RX ORDER — FENTANYL CITRATE 50 UG/ML
INJECTION, SOLUTION INTRAMUSCULAR; INTRAVENOUS PRN
Status: DISCONTINUED | OUTPATIENT
Start: 2020-02-18 | End: 2020-02-18

## 2020-02-18 RX ORDER — EPHEDRINE SULFATE 50 MG/ML
INJECTION, SOLUTION INTRAMUSCULAR; INTRAVENOUS; SUBCUTANEOUS PRN
Status: DISCONTINUED | OUTPATIENT
Start: 2020-02-18 | End: 2020-02-18

## 2020-02-18 RX ORDER — OXYCODONE HYDROCHLORIDE 5 MG/1
5-10 TABLET ORAL EVERY 4 HOURS PRN
Status: DISCONTINUED | OUTPATIENT
Start: 2020-02-18 | End: 2020-02-21 | Stop reason: HOSPADM

## 2020-02-18 RX ORDER — LIDOCAINE 40 MG/G
CREAM TOPICAL
Status: DISCONTINUED | OUTPATIENT
Start: 2020-02-18 | End: 2020-02-21 | Stop reason: HOSPADM

## 2020-02-18 RX ORDER — ATORVASTATIN CALCIUM 40 MG/1
40 TABLET, FILM COATED ORAL AT BEDTIME
Status: DISCONTINUED | OUTPATIENT
Start: 2020-02-18 | End: 2020-02-21 | Stop reason: HOSPADM

## 2020-02-18 RX ORDER — BUPIVACAINE HYDROCHLORIDE 5 MG/ML
INJECTION, SOLUTION PERINEURAL PRN
Status: DISCONTINUED | OUTPATIENT
Start: 2020-02-18 | End: 2020-02-18 | Stop reason: HOSPADM

## 2020-02-18 RX ORDER — ONDANSETRON 2 MG/ML
4 INJECTION INTRAMUSCULAR; INTRAVENOUS EVERY 30 MIN PRN
Status: DISCONTINUED | OUTPATIENT
Start: 2020-02-18 | End: 2020-02-18 | Stop reason: HOSPADM

## 2020-02-18 RX ORDER — PROPOFOL 10 MG/ML
INJECTION, EMULSION INTRAVENOUS PRN
Status: DISCONTINUED | OUTPATIENT
Start: 2020-02-18 | End: 2020-02-18

## 2020-02-18 RX ORDER — NEOSTIGMINE METHYLSULFATE 1 MG/ML
VIAL (ML) INJECTION PRN
Status: DISCONTINUED | OUTPATIENT
Start: 2020-02-18 | End: 2020-02-18

## 2020-02-18 RX ORDER — CELECOXIB 200 MG/1
200 CAPSULE ORAL ONCE
Status: COMPLETED | OUTPATIENT
Start: 2020-02-18 | End: 2020-02-18

## 2020-02-18 RX ORDER — LIDOCAINE HYDROCHLORIDE 20 MG/ML
INJECTION, SOLUTION INFILTRATION; PERINEURAL PRN
Status: DISCONTINUED | OUTPATIENT
Start: 2020-02-18 | End: 2020-02-18

## 2020-02-18 RX ORDER — GLYCOPYRROLATE 0.2 MG/ML
INJECTION, SOLUTION INTRAMUSCULAR; INTRAVENOUS PRN
Status: DISCONTINUED | OUTPATIENT
Start: 2020-02-18 | End: 2020-02-18

## 2020-02-18 RX ORDER — SODIUM CHLORIDE, SODIUM LACTATE, POTASSIUM CHLORIDE, CALCIUM CHLORIDE 600; 310; 30; 20 MG/100ML; MG/100ML; MG/100ML; MG/100ML
INJECTION, SOLUTION INTRAVENOUS CONTINUOUS
Status: DISCONTINUED | OUTPATIENT
Start: 2020-02-18 | End: 2020-02-20 | Stop reason: CLARIF

## 2020-02-18 RX ORDER — SODIUM CHLORIDE, SODIUM LACTATE, POTASSIUM CHLORIDE, CALCIUM CHLORIDE 600; 310; 30; 20 MG/100ML; MG/100ML; MG/100ML; MG/100ML
INJECTION, SOLUTION INTRAVENOUS CONTINUOUS PRN
Status: DISCONTINUED | OUTPATIENT
Start: 2020-02-18 | End: 2020-02-18

## 2020-02-18 RX ORDER — ONDANSETRON 4 MG/1
4 TABLET, ORALLY DISINTEGRATING ORAL EVERY 30 MIN PRN
Status: DISCONTINUED | OUTPATIENT
Start: 2020-02-18 | End: 2020-02-18 | Stop reason: HOSPADM

## 2020-02-18 RX ORDER — ALVIMOPAN 12 MG/1
12 CAPSULE ORAL ONCE
Status: DISCONTINUED | OUTPATIENT
Start: 2020-02-18 | End: 2020-02-18 | Stop reason: HOSPADM

## 2020-02-18 RX ORDER — CIPROFLOXACIN 2 MG/ML
400 INJECTION, SOLUTION INTRAVENOUS EVERY 12 HOURS
Status: COMPLETED | OUTPATIENT
Start: 2020-02-18 | End: 2020-02-19

## 2020-02-18 RX ORDER — CIPROFLOXACIN 2 MG/ML
400 INJECTION, SOLUTION INTRAVENOUS SEE ADMIN INSTRUCTIONS
Status: DISCONTINUED | OUTPATIENT
Start: 2020-02-18 | End: 2020-02-18 | Stop reason: HOSPADM

## 2020-02-18 RX ORDER — METOPROLOL SUCCINATE 100 MG/1
100 TABLET, EXTENDED RELEASE ORAL EVERY EVENING
Status: DISCONTINUED | OUTPATIENT
Start: 2020-02-18 | End: 2020-02-21 | Stop reason: HOSPADM

## 2020-02-18 RX ORDER — HYDROMORPHONE HYDROCHLORIDE 1 MG/ML
.3-.5 INJECTION, SOLUTION INTRAMUSCULAR; INTRAVENOUS; SUBCUTANEOUS EVERY 5 MIN PRN
Status: DISCONTINUED | OUTPATIENT
Start: 2020-02-18 | End: 2020-02-18 | Stop reason: HOSPADM

## 2020-02-18 RX ORDER — KETOROLAC TROMETHAMINE 15 MG/ML
15 INJECTION, SOLUTION INTRAMUSCULAR; INTRAVENOUS EVERY 6 HOURS PRN
Status: DISCONTINUED | OUTPATIENT
Start: 2020-02-18 | End: 2020-02-21 | Stop reason: HOSPADM

## 2020-02-18 RX ORDER — MAGNESIUM HYDROXIDE 1200 MG/15ML
LIQUID ORAL PRN
Status: DISCONTINUED | OUTPATIENT
Start: 2020-02-18 | End: 2020-02-18 | Stop reason: HOSPADM

## 2020-02-18 RX ORDER — FENTANYL CITRATE 50 UG/ML
25-50 INJECTION, SOLUTION INTRAMUSCULAR; INTRAVENOUS
Status: DISCONTINUED | OUTPATIENT
Start: 2020-02-18 | End: 2020-02-18 | Stop reason: HOSPADM

## 2020-02-18 RX ORDER — ALBUMIN, HUMAN INJ 5% 5 %
SOLUTION INTRAVENOUS CONTINUOUS PRN
Status: DISCONTINUED | OUTPATIENT
Start: 2020-02-18 | End: 2020-02-18

## 2020-02-18 RX ORDER — ACETAMINOPHEN 325 MG/1
975 TABLET ORAL EVERY 8 HOURS
Status: COMPLETED | OUTPATIENT
Start: 2020-02-18 | End: 2020-02-21

## 2020-02-18 RX ORDER — HEPARIN SODIUM 5000 [USP'U]/.5ML
5000 INJECTION, SOLUTION INTRAVENOUS; SUBCUTANEOUS
Status: COMPLETED | OUTPATIENT
Start: 2020-02-18 | End: 2020-02-18

## 2020-02-18 RX ORDER — ONDANSETRON 2 MG/ML
4 INJECTION INTRAMUSCULAR; INTRAVENOUS EVERY 6 HOURS PRN
Status: DISCONTINUED | OUTPATIENT
Start: 2020-02-18 | End: 2020-02-21 | Stop reason: HOSPADM

## 2020-02-18 RX ORDER — ONDANSETRON 2 MG/ML
INJECTION INTRAMUSCULAR; INTRAVENOUS PRN
Status: DISCONTINUED | OUTPATIENT
Start: 2020-02-18 | End: 2020-02-18

## 2020-02-18 RX ORDER — NALOXONE HYDROCHLORIDE 0.4 MG/ML
.1-.4 INJECTION, SOLUTION INTRAMUSCULAR; INTRAVENOUS; SUBCUTANEOUS
Status: DISCONTINUED | OUTPATIENT
Start: 2020-02-18 | End: 2020-02-21 | Stop reason: HOSPADM

## 2020-02-18 RX ORDER — CIPROFLOXACIN 2 MG/ML
400 INJECTION, SOLUTION INTRAVENOUS
Status: COMPLETED | OUTPATIENT
Start: 2020-02-18 | End: 2020-02-18

## 2020-02-18 RX ORDER — HYDROMORPHONE HYDROCHLORIDE 1 MG/ML
.3-.5 INJECTION, SOLUTION INTRAMUSCULAR; INTRAVENOUS; SUBCUTANEOUS
Status: DISCONTINUED | OUTPATIENT
Start: 2020-02-18 | End: 2020-02-21 | Stop reason: HOSPADM

## 2020-02-18 RX ORDER — NALOXONE HYDROCHLORIDE 0.4 MG/ML
.1-.4 INJECTION, SOLUTION INTRAMUSCULAR; INTRAVENOUS; SUBCUTANEOUS
Status: DISCONTINUED | OUTPATIENT
Start: 2020-02-18 | End: 2020-02-18

## 2020-02-18 RX ORDER — ACETAMINOPHEN 325 MG/1
975 TABLET ORAL ONCE
Status: COMPLETED | OUTPATIENT
Start: 2020-02-18 | End: 2020-02-18

## 2020-02-18 RX ORDER — LABETALOL HYDROCHLORIDE 5 MG/ML
10 INJECTION, SOLUTION INTRAVENOUS EVERY 10 MIN PRN
Status: DISCONTINUED | OUTPATIENT
Start: 2020-02-18 | End: 2020-02-18 | Stop reason: HOSPADM

## 2020-02-18 RX ORDER — VECURONIUM BROMIDE 1 MG/ML
INJECTION, POWDER, LYOPHILIZED, FOR SOLUTION INTRAVENOUS PRN
Status: DISCONTINUED | OUTPATIENT
Start: 2020-02-18 | End: 2020-02-18

## 2020-02-18 RX ORDER — HYDRALAZINE HYDROCHLORIDE 20 MG/ML
5 INJECTION INTRAMUSCULAR; INTRAVENOUS EVERY 10 MIN PRN
Status: DISCONTINUED | OUTPATIENT
Start: 2020-02-18 | End: 2020-02-18 | Stop reason: HOSPADM

## 2020-02-18 RX ADMIN — VECURONIUM BROMIDE 1 MG: 1 INJECTION, POWDER, LYOPHILIZED, FOR SOLUTION INTRAVENOUS at 10:52

## 2020-02-18 RX ADMIN — SODIUM CHLORIDE, POTASSIUM CHLORIDE, SODIUM LACTATE AND CALCIUM CHLORIDE: 600; 310; 30; 20 INJECTION, SOLUTION INTRAVENOUS at 16:27

## 2020-02-18 RX ADMIN — PHENYLEPHRINE HYDROCHLORIDE 100 MCG: 10 INJECTION INTRAVENOUS at 10:48

## 2020-02-18 RX ADMIN — SODIUM CHLORIDE, POTASSIUM CHLORIDE, SODIUM LACTATE AND CALCIUM CHLORIDE: 600; 310; 30; 20 INJECTION, SOLUTION INTRAVENOUS at 06:48

## 2020-02-18 RX ADMIN — PHENYLEPHRINE HYDROCHLORIDE 0.25 MCG/KG/MIN: 10 INJECTION INTRAVENOUS at 08:00

## 2020-02-18 RX ADMIN — METOPROLOL SUCCINATE 100 MG: 100 TABLET, EXTENDED RELEASE ORAL at 20:15

## 2020-02-18 RX ADMIN — VECURONIUM BROMIDE 2 MG: 1 INJECTION, POWDER, LYOPHILIZED, FOR SOLUTION INTRAVENOUS at 09:24

## 2020-02-18 RX ADMIN — LABETALOL HYDROCHLORIDE 5 MG: 5 INJECTION INTRAVENOUS at 14:06

## 2020-02-18 RX ADMIN — PROPOFOL 30 MG: 10 INJECTION, EMULSION INTRAVENOUS at 09:31

## 2020-02-18 RX ADMIN — NEOSTIGMINE METHYLSULFATE 4.5 MG: 1 INJECTION, SOLUTION INTRAVENOUS at 11:53

## 2020-02-18 RX ADMIN — HYDROMORPHONE HYDROCHLORIDE 0.5 MG: 1 INJECTION, SOLUTION INTRAMUSCULAR; INTRAVENOUS; SUBCUTANEOUS at 14:17

## 2020-02-18 RX ADMIN — Medication 5 MG: at 07:54

## 2020-02-18 RX ADMIN — Medication 10 MG: at 10:47

## 2020-02-18 RX ADMIN — HYDROMORPHONE HYDROCHLORIDE 0.25 MG: 1 INJECTION, SOLUTION INTRAMUSCULAR; INTRAVENOUS; SUBCUTANEOUS at 09:29

## 2020-02-18 RX ADMIN — FENTANYL CITRATE 25 MCG: 50 INJECTION, SOLUTION INTRAMUSCULAR; INTRAVENOUS at 13:28

## 2020-02-18 RX ADMIN — PROPOFOL 20 MG: 10 INJECTION, EMULSION INTRAVENOUS at 08:40

## 2020-02-18 RX ADMIN — SODIUM CHLORIDE, POTASSIUM CHLORIDE, SODIUM LACTATE AND CALCIUM CHLORIDE: 600; 310; 30; 20 INJECTION, SOLUTION INTRAVENOUS at 08:28

## 2020-02-18 RX ADMIN — PROCHLORPERAZINE EDISYLATE 5 MG: 5 INJECTION INTRAMUSCULAR; INTRAVENOUS at 13:38

## 2020-02-18 RX ADMIN — ACETAMINOPHEN 975 MG: 325 TABLET, FILM COATED ORAL at 23:13

## 2020-02-18 RX ADMIN — MIDAZOLAM HYDROCHLORIDE 2 MG: 1 INJECTION, SOLUTION INTRAMUSCULAR; INTRAVENOUS at 07:27

## 2020-02-18 RX ADMIN — CIPROFLOXACIN 400 MG: 2 INJECTION, SOLUTION INTRAVENOUS at 20:15

## 2020-02-18 RX ADMIN — PROPOFOL 40 MG: 10 INJECTION, EMULSION INTRAVENOUS at 08:39

## 2020-02-18 RX ADMIN — ALBUMIN HUMAN: 0.05 INJECTION, SOLUTION INTRAVENOUS at 11:22

## 2020-02-18 RX ADMIN — ROCURONIUM BROMIDE 50 MG: 10 INJECTION INTRAVENOUS at 07:43

## 2020-02-18 RX ADMIN — LABETALOL HYDROCHLORIDE 10 MG: 5 INJECTION INTRAVENOUS at 14:21

## 2020-02-18 RX ADMIN — METRONIDAZOLE 500 MG: 500 INJECTION, SOLUTION INTRAVENOUS at 07:50

## 2020-02-18 RX ADMIN — VECURONIUM BROMIDE 1 MG: 1 INJECTION, POWDER, LYOPHILIZED, FOR SOLUTION INTRAVENOUS at 10:32

## 2020-02-18 RX ADMIN — SODIUM CHLORIDE, POTASSIUM CHLORIDE, SODIUM LACTATE AND CALCIUM CHLORIDE: 600; 310; 30; 20 INJECTION, SOLUTION INTRAVENOUS at 07:08

## 2020-02-18 RX ADMIN — PROPOFOL 200 MG: 10 INJECTION, EMULSION INTRAVENOUS at 07:43

## 2020-02-18 RX ADMIN — DEXMEDETOMIDINE HYDROCHLORIDE 0.3 MCG/KG/HR: 100 INJECTION, SOLUTION INTRAVENOUS at 08:42

## 2020-02-18 RX ADMIN — HYDRALAZINE HYDROCHLORIDE 5 MG: 20 INJECTION INTRAMUSCULAR; INTRAVENOUS at 14:39

## 2020-02-18 RX ADMIN — METRONIDAZOLE 500 MG: 500 INJECTION, SOLUTION INTRAVENOUS at 16:31

## 2020-02-18 RX ADMIN — HYDROMORPHONE HYDROCHLORIDE 0.5 MG: 1 INJECTION, SOLUTION INTRAMUSCULAR; INTRAVENOUS; SUBCUTANEOUS at 08:38

## 2020-02-18 RX ADMIN — HYDROMORPHONE HYDROCHLORIDE 0.25 MG: 1 INJECTION, SOLUTION INTRAMUSCULAR; INTRAVENOUS; SUBCUTANEOUS at 09:28

## 2020-02-18 RX ADMIN — HEPARIN SODIUM 5000 UNITS: 10000 INJECTION, SOLUTION INTRAVENOUS; SUBCUTANEOUS at 07:36

## 2020-02-18 RX ADMIN — DEXMEDETOMIDINE HYDROCHLORIDE 8 MCG: 100 INJECTION, SOLUTION INTRAVENOUS at 10:02

## 2020-02-18 RX ADMIN — PROPOFOL 40 MG: 10 INJECTION, EMULSION INTRAVENOUS at 11:26

## 2020-02-18 RX ADMIN — ONDANSETRON 4 MG: 2 INJECTION INTRAMUSCULAR; INTRAVENOUS at 13:16

## 2020-02-18 RX ADMIN — DEXMEDETOMIDINE HYDROCHLORIDE 4 MCG: 100 INJECTION, SOLUTION INTRAVENOUS at 08:39

## 2020-02-18 RX ADMIN — CELECOXIB 200 MG: 200 CAPSULE ORAL at 06:49

## 2020-02-18 RX ADMIN — HYDROMORPHONE HYDROCHLORIDE 0.5 MG: 1 INJECTION, SOLUTION INTRAMUSCULAR; INTRAVENOUS; SUBCUTANEOUS at 21:23

## 2020-02-18 RX ADMIN — VECURONIUM BROMIDE 1 MG: 1 INJECTION, POWDER, LYOPHILIZED, FOR SOLUTION INTRAVENOUS at 11:17

## 2020-02-18 RX ADMIN — LABETALOL HYDROCHLORIDE 5 MG: 5 INJECTION INTRAVENOUS at 14:01

## 2020-02-18 RX ADMIN — PROPOFOL 40 MG: 10 INJECTION, EMULSION INTRAVENOUS at 10:44

## 2020-02-18 RX ADMIN — GLYCOPYRROLATE 0.7 MG: 0.2 INJECTION, SOLUTION INTRAMUSCULAR; INTRAVENOUS at 11:53

## 2020-02-18 RX ADMIN — SODIUM CHLORIDE, POTASSIUM CHLORIDE, SODIUM LACTATE AND CALCIUM CHLORIDE: 600; 310; 30; 20 INJECTION, SOLUTION INTRAVENOUS at 14:09

## 2020-02-18 RX ADMIN — Medication 5 MG: at 08:05

## 2020-02-18 RX ADMIN — ONDANSETRON 4 MG: 2 INJECTION INTRAMUSCULAR; INTRAVENOUS at 10:37

## 2020-02-18 RX ADMIN — SODIUM CHLORIDE, POTASSIUM CHLORIDE, SODIUM LACTATE AND CALCIUM CHLORIDE: 600; 310; 30; 20 INJECTION, SOLUTION INTRAVENOUS at 11:57

## 2020-02-18 RX ADMIN — DEXMEDETOMIDINE HYDROCHLORIDE 8 MCG: 100 INJECTION, SOLUTION INTRAVENOUS at 08:36

## 2020-02-18 RX ADMIN — ACETAMINOPHEN 975 MG: 325 TABLET, FILM COATED ORAL at 16:31

## 2020-02-18 RX ADMIN — LIDOCAINE HYDROCHLORIDE 100 MG: 20 INJECTION, SOLUTION INFILTRATION; PERINEURAL at 07:43

## 2020-02-18 RX ADMIN — DEXAMETHASONE SODIUM PHOSPHATE 4 MG: 4 INJECTION, SOLUTION INTRA-ARTICULAR; INTRALESIONAL; INTRAMUSCULAR; INTRAVENOUS; SOFT TISSUE at 08:52

## 2020-02-18 RX ADMIN — ACETAMINOPHEN 975 MG: 325 TABLET, FILM COATED ORAL at 06:49

## 2020-02-18 RX ADMIN — ATORVASTATIN CALCIUM 40 MG: 40 TABLET, FILM COATED ORAL at 21:23

## 2020-02-18 RX ADMIN — SODIUM CHLORIDE, POTASSIUM CHLORIDE, SODIUM LACTATE AND CALCIUM CHLORIDE: 600; 310; 30; 20 INJECTION, SOLUTION INTRAVENOUS at 07:48

## 2020-02-18 RX ADMIN — FENTANYL CITRATE 100 MCG: 50 INJECTION, SOLUTION INTRAMUSCULAR; INTRAVENOUS at 07:43

## 2020-02-18 RX ADMIN — CIPROFLOXACIN 400 MG: 2 INJECTION INTRAVENOUS at 07:50

## 2020-02-18 RX ADMIN — Medication 5 MG: at 07:57

## 2020-02-18 RX ADMIN — SODIUM CHLORIDE, POTASSIUM CHLORIDE, SODIUM LACTATE AND CALCIUM CHLORIDE: 600; 310; 30; 20 INJECTION, SOLUTION INTRAVENOUS at 18:28

## 2020-02-18 RX ADMIN — HYDRALAZINE HYDROCHLORIDE 5 MG: 20 INJECTION INTRAMUSCULAR; INTRAVENOUS at 14:50

## 2020-02-18 RX ADMIN — VECURONIUM BROMIDE 2 MG: 1 INJECTION, POWDER, LYOPHILIZED, FOR SOLUTION INTRAVENOUS at 08:56

## 2020-02-18 RX ADMIN — ALBUMIN HUMAN: 0.05 INJECTION, SOLUTION INTRAVENOUS at 08:07

## 2020-02-18 ASSESSMENT — ACTIVITIES OF DAILY LIVING (ADL)
ADLS_ACUITY_SCORE: 12
ADLS_ACUITY_SCORE: 11

## 2020-02-18 ASSESSMENT — MIFFLIN-ST. JEOR: SCORE: 1739.69

## 2020-02-18 NOTE — OP NOTE
OPERATIVE REPORT      PREOPERATIVE DIAGNOSIS:  Sigmoid diverticulitis    POSTOPERATIVE DIAGNOSIS:  Same    OPERATIONS PERFORMED:  1.  Robotic assisted sigmoid colectomy with colorectal anastomosis  2.  Robotic mobilization of the splenic flexure  3.  Rigid proctoscopy    SURGEON:  Ethan Zuleta MD    COSURGEON:  Lakesha Mcdowell MD    ANESTHESIA:  General.    INDICATIONS FOR PROCEDURE:  The patient is a 69-year-old male with a history of sigmoid diverticulitis. He met with Dr. Zuleta in clinic and Dr. Zuleta recommended surgery.  Dr. Zuleta discussed the risks and benefits of the procedure as he agreed to proceed.    OPERATIVE FINDINGS:  A size 28 mm EEA stapler was used to create the colo-rectal anastomosis.  The anastomosis was hemostatic.  Insufflation from below did not reveal any evidence of leak.       PROCEDURE IN DETAIL:  See Dr. Zuleta operative report for full details of the surgery.  I performed the stapled anastomosis from below and rigid proctoscopy.  I went below and passed the 28 mm EEA stapler into the anal canal, advancing the spike through the staple line of the rectum.  The 2 ends of the stapler were connected, tightened and fired.  Two anastomotic doughnuts were completely intact and were sent separately to pathology.  The anastomosis was then tested by insufflating from below through the proctoscope with the anastomosis under saline.  There was no evidence of leak despite multiple insufflations.  Excess air was evacuated from below through the proctoscope, and the abdomen was irrigated with a copious amount of saline solution and sucked dry.  Hemostasis was evident. Dr. Zuleta completed the abdominal closure.     LAKESHA MCDOWELL MD

## 2020-02-18 NOTE — ANESTHESIA PREPROCEDURE EVALUATION
Anesthesia Pre-Procedure Evaluation    Patient: Simon Martines   MRN: [id number] : 1950          Preoperative Diagnosis: Diverticulitis [K57.92]    Procedure(s):  ROBOTIC SIGMOID COLECTOMY  URETERAL CATHETER INSERTION    Past Medical History:   Diagnosis Date     Anxiety      Arrhythmia     paroxysmal a fib      Ascending aorta dilatation (H)      Cancer (H)      Coronary artery disease      Depressive disorder      Diverticulitis      Hypertension      Renal disease     CKD     Stress fracture of foot      Past Surgical History:   Procedure Laterality Date     APPENDECTOMY       CARDIAC SURGERY      CABG     GENITOURINARY SURGERY         Anesthesia Evaluation     . Pt has had prior anesthetic. Type: General    No history of anesthetic complications          ROS/MED HX    ENT/Pulmonary:      (-) tobacco use, asthma, COPD and sleep apnea   Neurologic:  - neg neurologic ROS     Cardiovascular: Comment: Aortic root dilation    (+) hypertension--CAD, -CABG-date:  with AVR, . : . . . :. dysrhythmias a-fib, . Previous cardiac testing Echodate:results:LVEF 50%date: results: date: results: date: results:          METS/Exercise Tolerance:     Hematologic:         Musculoskeletal:         GI/Hepatic:     (+) Inflammatory bowel disease, Other GI/Hepatic diverticulitis     (-) liver disease   Renal/Genitourinary: Comment: Hx of nephrectomy for RCC    (+) chronic renal disease, type: CRI,       Endo:         Psychiatric:     (+) psychiatric history anxiety and depression      Infectious Disease:         Malignancy:         Other:                          Physical Exam  Normal systems: cardiovascular, pulmonary and dental    Airway   Mallampati: I  TM distance: >3 FB  Neck ROM: full    Dental   Comment: The patient denies any loose, chipped, or missing teeth.    Cardiovascular       Pulmonary             Lab Results   Component Value Date    WBC 9.5 2014    HGB 12.5 (L) 2014    HCT 37.5 (L)  "02/22/2014     02/22/2014    CRP 63.7 (H) 02/22/2014    SED 39 (H) 02/22/2014     02/22/2014    POTASSIUM 3.6 02/22/2014    CHLORIDE 103 02/22/2014    CO2 31 02/22/2014    BUN 32 (H) 02/22/2014    CR 1.84 (H) 02/22/2014     (H) 02/22/2014    GEORGES 9.2 02/22/2014       Preop Vitals  BP Readings from Last 3 Encounters:   02/22/14 (!) 146/96   03/12/08 170/85    Pulse Readings from Last 3 Encounters:   02/22/14 97   03/12/08 80      Resp Readings from Last 3 Encounters:   02/22/14 16    SpO2 Readings from Last 3 Encounters:   02/22/14 96%      Temp Readings from Last 1 Encounters:   02/22/14 36.8  C (98.3  F) (Oral)    Ht Readings from Last 1 Encounters:   08/15/17 1.88 m (6' 2.02\")      Wt Readings from Last 1 Encounters:   08/15/17 95.3 kg (210 lb 3.2 oz)    Estimated body mass index is 26.98 kg/m  as calculated from the following:    Height as of 8/15/17: 1.88 m (6' 2.02\").    Weight as of 8/15/17: 95.3 kg (210 lb 3.2 oz).       Anesthesia Plan      History & Physical Review  History and physical reviewed and following examination; no interval change.    ASA Status:  3 .    NPO Status:  > 8 hours    Plan for General and ETT with Intravenous induction. Maintenance will be Inhalation.    PONV prophylaxis:  Ondansetron (or other 5HT-3) and Dexamethasone or Solumedrol  Additional equipment: 2nd IV      Postoperative Care  Postoperative pain management:  IV analgesics.  Plan for postoperative opioid use.    Consents  Anesthetic plan, risks, benefits and alternatives discussed with:  Patient.  Use of blood products discussed: Yes.   Use of blood products discussed with Patient.  Consented to blood products.  .                 Cheng Amin MD  "

## 2020-02-18 NOTE — OR NURSING
Patient Simon Martines is in stable condition and has met criteria for PACU discharge.  [facility] was  informed and a sign out was given for Unit/Station transfer.

## 2020-02-18 NOTE — OP NOTE
Procedure Date: 02/18/2020      SURGEON:  Simon Jesus MD      ANESTHESIA:  General anesthetic.      PROCEDURE:  Cystoscopy with left stent placement.      INDICATIONS:  I have been asked to place a stent on this very pleasant 69-year-old gentleman.  He is going to undergo a sigmoid colectomy for diverticulitis at the request of Dr. Zuleta.      DESCRIPTION OF PROCEDURE:  The patient was brought to the operative suite and after induction of anesthesia, he was placed in the dorsal lithotomy position with the genitalia prepped and draped in customary fashion.      A timeout was then called.  A 24-Polish cystoscope was passed into the penile urethra.  The penile urethra was normal.  The external sphincter was intact.  When viewed from the verumontanum, there was moderate enlargement of the lateral lobes of the prostate.  There was a small but significant median lobe extending slightly over the trigone of the bladder.  The interior of the bladder showed grade 1-2 trabeculation.  No evidence of neoplasm, stone or fistula was seen in the bladder.  The right ureteric orifice was identified as was the left.  On the right side, he had already had a nephrectomy, so we will not plan to place a stent on that side.  On the left side, I passed a 6-Polish TigerTail catheter into the ureter and up into the renal pelvis, removed the cystoscope, passed a 16-Polish coude catheter through the urethra into the bladder, inflated the balloon with 10 mL of fluid.  Then with the aid of a needle and a guidewire, I was able to pass the ureteral catheter through the wall of the Lan catheter so both could be connected to the same drainage system.  I did also place a silk tie around the ureteral catheter to the Lan catheter for additional support.  At that point, I was happy that the system was going to work well, and we turned the procedure over to Dr. Zuleta, who will dictate the main part of the procedure in his operative note.          PHILIP WARNER MD             D: 2020   T: 2020   MT: ANA LAURA      Name:     PHILIP HOWARD   MRN:      [id number]        Account:        [id number]   :      1950           Procedure Date: 2020      Document: [id number]

## 2020-02-18 NOTE — BRIEF OP NOTE
[facility]    Brief Operative Note    Pre-operative diagnosis: Diverticulitis [K57.92]  Post-operative diagnosis Same as pre-operative diagnosis    Procedure: Procedure(s):  ROBOTIC SIGMOID COLECTOMY, ROBOTIC MOBILIZATION OF SPLENIC FLEXURE  CYSTOSCOPY, URETERAL CATHETER INSERTION  Surgeon: Surgeon(s) and Role:  Panel 1:     * Ethan Zuleta MD - Primary     * Sanjuana Vivar PA-C - Assisting  Panel 2:     * Simon Jesus MD - Primary  Anesthesia: General   Estimated blood loss: 100ml  Drains: None  Specimens:   ID Type Source Tests Collected by Time Destination   A : SIGMOID COLON STAPLE LINE DISTAL Tissue Large Intestine, Sigmoid SURGICAL PATHOLOGY EXAM Ethan Zuleta MD 2/18/2020 10:24 AM      Findings:   inflamed colon consistent with chronic diverticulitis.  Complications: None.  Implants: * No implants in log *    Condition on discharge from OR: Satisfactory    Sanjuana Vivar PA-C   [facility]   [phone].        ADDENDUM:    PATIENT DATA  Indicate Y or N:  Home O2 No  Hemodialysis  No  Transplant patient  No  Cirrhosis  No  Steroids in last 30 days  No  Immunomodulators in last 30 days  No  Anticoagulation at time of surgery  No   List medication n/a  Prior abdominal surgery  Yes  Pelvic irradiation  No    Albumin within 30 days if known No results found for: ALBUMIN     Hgb within 30 days if known    Hemoglobin   Date Value Ref Range Status   02/18/2020 13.5 13.3 - 17.7 g/dL Final   ]  Cr within 30 days if known    Creatinine   Date Value Ref Range Status   02/18/2020 1.53 (H) 0.66 - 1.25 mg/dL Final   ]  Body mass index is 25.47 kg/m .      OR DATA  Emergent  No   <24 hours  No   <1 week  No  Bowel Prep Yes  Antibiotics  Yes  DVT prophylaxis    Heparin  Yes   SCD  Yes   None  No  Drain  No  ASA (1,2,3,4) 3  OR time (min) 240  Stents  Yes  Transfuse >/= 2U  No  Anastomosis   Stapled  Yes   Handsewn  No  Leak Test    Positive   No   Negative  Yes   Not done  No   Route (Have a good day)      Sanjuana Vivar PA-C on 2/18/2020 at 12:12 PM

## 2020-02-18 NOTE — OR NURSING
Telephone report was given to Station [address] RN.  Patient will be transported to Room. 30-2 via cart accompanied by RN and NA.  Belongings: 2 hospital bag. Family : wife updated via text messaging

## 2020-02-18 NOTE — ANESTHESIA PROCEDURE NOTES
ARTERIAL LINE PROCEDURE NOTE:   Pre-Procedure  Performed by Cheng Amin MD  Location: pre-op      Pre-Anesthestic Checklist: patient identified, IV checked, risks and benefits discussed, informed consent, monitors and equipment checked and pre-op evaluation    Timeout  Correct Patient: Yes   Correct Procedure: Yes   Correct Site: Yes   Correct Laterality: N/A   Correct Position: Yes   Site Marked: N/A   .   Procedure Documentation  Procedure: arterial line    Supine  Insertion Site:radial.Skin infiltrated with mL of 1% lidocaine. Injection technique: Seldinger Technique and ultrasound guided  .  .  Patient Prep/Sterile Barriers; chlorhexidine gluconate and isopropyl alcohol, patient draped    Assessment/Narrative         Secured by other  Tegaderm dressing used.    Arterial waveform: Yes     Comments:  A sterile prep of chlorhexidine was used for preparation. A hat, sterile gloves, and mask were wore by the provider of care. Sterile drapes placed on the site. No complications. Arterial line secured further with tape.

## 2020-02-18 NOTE — ANESTHESIA POSTPROCEDURE EVALUATION
Patient: Simon Martines    Procedure(s):  ROBOTIC SIGMOID COLECTOMY, ROBOTIC MOBILIZATION OF SPLENIC FLEXURE  CYSTOSCOPY, URETERAL CATHETER INSERTION    Diagnosis:Diverticulitis [K57.92]  Diagnosis Additional Information: No value filed.    Anesthesia Type:  General, ETT    Note:  Anesthesia Post Evaluation    Patient location during evaluation: PACU  Patient participation: Able to fully participate in evaluation  Level of consciousness: awake and alert  Pain management: adequate  Airway patency: patent  Cardiovascular status: acceptable  Respiratory status: acceptable  Hydration status: acceptable  PONV: none     Anesthetic complications: None          Last vitals:  Vitals:    02/18/20 1500 02/18/20 1510 02/18/20 1534   BP: (!) 162/93 134/81 131/78   Pulse: 78 86    Resp: 12 12 16   Temp: 35.3  C (95.5  F) 35.3  C (95.5  F) 36.5  C (97.7  F)   SpO2: 97% 97% 96%         Electronically Signed By: Cheng Amin MD  February 18, 2020  4:37 PM

## 2020-02-18 NOTE — ANESTHESIA CARE TRANSFER NOTE
Patient: Simon Martines    Procedure(s):  ROBOTIC SIGMOID COLECTOMY, ROBOTIC MOBILIZATION OF SPLENIC FLEXURE  CYSTOSCOPY, URETERAL CATHETER INSERTION    Diagnosis: Diverticulitis [K57.92]  Diagnosis Additional Information: No value filed.    Anesthesia Type:   General, ETT     Note:  Airway :Face Mask  Patient transferred to:PACU  Comments: Neuromuscular blockade reversed after TOF 4/4, spontaneous respirations, adequate tidal volumes, followed commands to voice, oropharynx suctioned with soft flexible catheter, extubated atraumatically, extubated with suction, airway patent after extubation.  Oxygen via facemask at 8 liters per minute to PACU. Oxygen tubing connected to wall O2 in PACU, SpO2, NiBP, and EKG monitors and alarms on and functioning, Mc Hugger warmer connected to patient gown, report on patient's clinical status given to PACU RN, RN questions answered. Handoff Report: Identifed the Patient, Identified the Reponsible Provider, Reviewed the pertinent medical history, Discussed the surgical course, Reviewed Intra-OP anesthesia mangement and issues during anesthesia, Set expectations for post-procedure period and Allowed opportunity for questions and acknowledgement of understanding      Vitals: (Last set prior to Anesthesia Care Transfer)    CRNA VITALS  2/18/2020 1141 - 2/18/2020 1217      2/18/2020             Pulse:  65    ART BP:  145/59    ART Mean:  94    Ht Rate:  65    SpO2:  100 %    Resp Rate (observed):  (!) 7                Electronically Signed By: ELIZABETH Anderson CRNA  February 18, 2020  12:17 PM

## 2020-02-18 NOTE — PROGRESS NOTES
Medication History Completed by Medication Scribe  Admission medication history interview status for the 2/18/2020  admission is complete. See EPIC admission navigator for prior to admission medications     Medication history sources: Patient, H&P and Care Everywhere  Medication history source reliability: Moderate  Adherence assessment: N/A Not Observed    Significant changes made to the medication list:  None      Additional medication history information:   None    Medication reconciliation completed by provider prior to medication history? No    Time spent in this activity: 30 MINUTES      Prior to Admission medications    Medication Sig Last Dose Taking? Auth Provider   acetaminophen (TYLENOL) 500 MG tablet Take 500 mg by mouth 2 times daily as needed for mild pain  2/16/2020 at PRN Yes Reported, Patient   atorvastatin (LIPITOR) 40 MG tablet Take 40 mg by mouth At Bedtime 2/17/2020 at PM Yes Reported, Patient   diphenhydrAMINE (BENADRYL) 25 MG tablet Take 25 mg by mouth daily as needed for allergies or sleep (spring and fall allergies)  2/15/2020 at PRN Yes Reported, Patient   enoxaparin ANTICOAGULANT (LOVENOX) 100 MG/ML syringe Inject 90 mg Subcutaneous every 12 hours 2/17/2020 at 0800 Yes Reported, Patient   furosemide (LASIX) 40 MG tablet Take 40 mg by mouth daily 2/17/2020 at AM Yes Reported, Patient   levofloxacin (LEVAQUIN) 500 MG tablet Take 500 mg by mouth daily 2/18/2020 at 0415 Yes Reported, Patient   metoprolol succinate ER (TOPROL-XL) 100 MG 24 hr tablet Take 100 mg by mouth daily 2/17/2020 at PM Yes Reported, Patient   metroNIDAZOLE (FLAGYL) 250 MG tablet Take 250 mg by mouth 3 times daily 2/18/2020 at 0415 Yes Reported, Patient   Probiotic Product (PROBIOTIC-10 PO) Take 1 capsule by mouth 2 times daily MORE THAN A WEEK Yes Reported, Patient   terazosin (HYTRIN) 5 MG capsule Take 5 mg by mouth 2 times daily 2/18/2020 at 0415 Yes Reported, Patient   warfarin ANTICOAGULANT (COUMADIN) 1 MG tablet Take  3-4 mg by mouth daily Take:  On Wednesday and Saturday 4 X 1mg = 4 mg dose.  On Monday,Tuesday,Thursday, Friday and Sunday 3 X 1 mg = 3 mg dose. 2/12/2020 at PM Yes Reported, Patient

## 2020-02-18 NOTE — OR NURSING
Assume patient care for Simon Martines at 1:45 PM.  Verbal report was  received from Daylin SANTANA.

## 2020-02-18 NOTE — OP NOTE
Operative Report    Pre Operative Diagnosis:  1)  Sigmoid Diverticulitis    Post Operative Diagnosis  1) Sigmoid diverticulitis     Surgery:  1)  Robotic sigmoid colectomy  2)  Robotic mobilization of splenic flexure  3)  Intraoperative fluorescence angiography  4)  Colorectal anastomosis  5)  Rigid proctoscopy  6)  Cystoscopy with placement of left sided ureteral catheter (Dr. Jesus)    Surgeon: Ethan Zuleta MD  Assistant: Lakesha Johnson MD  Second Assistant: Sanjuana Vivar PA-C  Co Surgeons: Simon Jesus MD (Urology)    Anesthesia: General    EBL:  100    Findings: The sigmoid colon was thickened and firm consistent with diverticular disease.  There was no evidence of abscess or perforation.  There were adhesions of the sigmoid colon to the left pelvic side wall and anterior abdominal wall.  I performed a medial to lateral mobilization of the sigmoid colon with full mobilization of the splenic flexure.  The distal transection was within the upper rectum and we created an end to end stapled anastomosis.  The anastomosis was created with excellent blood supply and without tension.  A pneumatic leak test of the anastomosis was negative for any extravasation of air.      Indication: Simon Martines is a 69 year old male who has a history of sigmoid diverticulitis with a small  abscess.  He has a history of multiple attacks documented by CT scan.  He was advised of the risks and benefits or surgery.  The risks discussed include but are not limited to the risk of bleeding, anastomotic leak, wound infection, injury to adjacent structures, development of a hernia, need for further surgery including creating a stoma, and even more serious complications.  We discussed expectations regarding recovery.  He wishes to proceed.    Description of procedure:  After obtaining informed consent the patient was brought to the operating room after a complete bowel prep.  He was placed on the operating table and general anesthesia was  induced.  He was intubated by the anesthesia service without difficulty.      Dr. Jesus began the case by performing cystoscopy  and placement of bilateral ureteral catheters.  Please see his operative reports for details    An orogastric tube was placed.  The patient's arms were tucked at his side.  He was secured to the table with tape across the chest.  He was placed in the low modified lithotomy position.  All pressure points were inspected and padded appropriately.  The abdomen was shaved prepped and draped in the usual sterile fashion.  A timeout was undertaken which identified the patient and the correct procedure.    We began by making a stab incision in the left upper quadrant of the abdomen.  A Veres needle was used to establish pneumoperitoneum.  A small incision was made in the supraumbilical position.  An 8 mm robotic trocar was placed here without difficulty.  We then surveyed the abdomen with 30 degree angled robotic camera.  There is no evidence of injury to the underlying viscera and the Veres needle was removed.  The abdomen was surveyed and there is no evidence of other pathology.  We then performed a laparoscopic-assisted TAP block utilizing 30 mL of half percent Marcaine plain.  This was injected in four aliquots; one for each quadrant of the abdomen.    We placed 2 additional 8 mm trochars on the left side of the abdomen running in the line across the abdomen transversely.  We placed an additional 12 mm robotic trocar in the right lower quadrant and a 8 mm air seal trocar in the right upper quadrant.  The air seal device was used to maintain pneumoperitoneum throughout the case.    We then placed the patient in steep Trendelenburg position.  At this point we docked the robot over the patient's left side and centered the robot on the inferior mesenteric artery.  We utilized a small grasping retractor and arm #1 tip up grasper in arm #2 the camera and arm #3 and a monopolar scissors and arm #4.   Arm #4 was switched out to the vessel sealing device and a robotic stapler as needed.  With this instrumentation we performed a mobilization of the colon.    We then performed a medial to lateral mobilization of the sigmoid colon.  Sigmoid colon was retracted cephalad placing the inferior mesenteric artery and stretch.  Peritoneum medial to the vessel was incised with monopolar scissors elevating the colonic mesentery off the retroperitoneum.  We bluntly dissected in this plane elevating the mesial colon off the retroperitoneum the left ureter was identified and swept posteriorly out of harm's way.  This allowed us to isolate the inferior mesenteric artery which allowed us to triply ligated with the vessel sealing device with excellent hemostasis The adjacent vein was also ligated and transected with excellent hemostasis.  We continued our medial to lateral mobilization to the abdominal sidewall laterally from the sacral promontory up to the level of the splenic flexure.  We then turned our attention laterally and incised the lateral attachments with the monopolar scissors connecting our lateral dissection to our medial dissection.    We then turned our attention back to the pelvis.  At this time a point for distal transection was chosen.  The upper rectum was mobilized by incising the areolar attachments of the mesial rectum to the presacral fascia.  The peritoneum along the upper rectum was incised to allow mobilization of the upper rectum.  We then incised the peritoneum of the upper mesorectum up to the level of the bowel wall at the point chosen for distal transection.  The mesorectum here was then transected with the vessel sealing device to circumferentially isolate the rectum at this level.  We then transected the bowel at this level with a single firing of a 60 mm green load robotic VINI stapler.  A point for proximal transection was chosen where the bowel appeared to be nice and soft. The intervening  "mesentery was then ligated and transected with the vessel sealing device.     We then assessed perfusion to our proximal colonic conduit with intraoperative fluorescence angiography.  Three mL of dilute indocyanine green was injected intravenously by the anesthesia team.  We utilized the Firefly technology on the robotic laparoscope to identify blood flow.  We identified excellent perfusion of the bowel to the entire conduit.    At this point the robot instruments were removed and the robot was undocked.  The patient was placed in a more neutral position.  We used the robotic camera to identify our proximal staple line.  A grasper was placed on the staple line.  Pneumoperitoneum was evacuated.  We made a small Pfannenstiel incision approximately 3 fingerbreadths above the pubic symphysis dissection was taken down with electrocautery the fascia was incised in a transverse fashion and then elevated off the rectus muscles superiorly and inferiorly.  The muscles fibers of the rectus were spread in the midline and the peritoneum was incised with great care to avoid injury to the bladder.  An Blair wound retractor was used to facilitate exposure.  This allowed us to extract the proximal staple line and the specimen through a Pfannenstiel incision.  With the bowel nicely eviscerated a point for proximal transection was chosen.  The bowel was isolated here with electrocautery.  The intervening mesentery was then transected between Carmalt clamps and controlled with Vicryl ties.  The bowel was then divided between Sammy clamps.  Specimen was sent off the field labeled as \"sigmoid colon staple line distal\".      We then prepared the proximal bowel for anastomosis.  A 2-0 Prolene suture was then placed in a pursestring fashion around the cut edge of the bowel wall.  The anvil of a 29 mm EEA stapler was then secured within the lumen of the bowel and the suture was tied down to bring the bowel wall type to the shaft of the " anvil.  Fat was cleared of the adjacent bowel wall close to the anvil and a second pursestring suture of the 2-0 Prolene was placed to secure the anvil.  This was then reintroduced into the abdomen.  The Lbair wound retractor was removed and the peritoneum was reapproximated with a running suture of 2-0 Vicryl.  The fascia was then reapproximated at the site with 2 running sutures of looped 0 PDS.  Pneumoperitoneum was reestablished.    We then performed a full mobilization of the splenic flexure.  The patient was then placed in slight reverse Trendelenburg position with the left side up.  The boom of the robot was then spun around 180 degrees center on the splenic flexure.  We utilized the patient's 3 left-sided trochars.  In the left most trocar we used a vessel sealing device the camera was in the adjacent trocar and a tip up grasper was in the supraumbilical trocar.  With this instrumentation the omentum was retracted cephalad above the transverse colon and the transverse colon was identified.  The attachments of the omentum to the transverse colon were then taken with the vessel sealing device to enter into the lesser sac the posterior wall of the stomach was identified.  The remaining attachments of the omentum to the transverse colon as the colon extended distally towards the spleen were then transected.  This allowed us to take the superior lateral and posterior attachments of the splenic flexure to elevate the colon and its mesocolon off of Gerota's fascia.  The remaining attachments overlying the tail of the pancreas were incised with great care to avoid injury to the pancreas.  We connected our lateral dissection to where we had previously dissected the lateral attachments of the descending colon.  This allowed us to elevate the transverse and descending colon to identify the cut edge of the mesentery from our previous dissection.  This allowed us to continue to incise the mesentery and allowed us to  ligate and transect the inferior mesenteric vein just distal to its takeoff from the ligament of Treitz.  The instruments were removed and the robot was undocked.  Pneumoperitoneum was maintained.    The patient was then again placed in Trendelenburg position.  We utilized the robotic camera as a laparoscope.  The proximal bowel of the descending colon was identified and the anvil was identified.       Next the small bowel was swept up out of the pelvis.  My assistant, Dr Johnson, then went to the perineal field.  Under my direction sizers were placed via the anus to the top of the rectal stump.  The EEA stapler was then placed via the anus to the top of the rectal stump.  Stapler was flush with the bowel wall here and the spike was deployed through the bowel wall just adjacent to the staple line in its midportion.  Utilizing laparoscopic instruments the anvil was  to the stapler stapler was closed and fired after ensuring that the proximal bowel was in proper orientation by inspecting the cut edge of the mesentery to its origin.  The stapler was easily retrieved.  The anastomotic rings were inspected and found to be intact.  The pelvis was filled with saline for a pneumatic leak test.  My assistant performed a rigid proctoscopy and a pneumatic leak test was negative.    We closed the right lower quadrant 12 mm trocar site with a Ben-Donna device utilizing 0 Vicryl tie.  Similarly the right upper quadrant air seal trocar site was closed with a Ben-Donna device utilizing 0 Vicryl tie.  At this point pneumoperitoneum was evacuated.  All skin wounds closed with 4-0 Monocryl in a subcuticular fashion after irrigation.  Dermabond was placed as a dressing.  The patient was placed in supine position, drapes were taken down, he was awakened extubated and transferred to the PACU in stable condition.  Lap, sponge, and needle counts correct at the end of the case x2.    Ethan Zuleta MD

## 2020-02-18 NOTE — ADDENDUM NOTE
Addendum  created 02/18/20 1711 by Cheng Amin MD    Child order released for a procedure order, Clinical Note Signed, Intraprocedure Blocks edited, LDA created via procedure documentation

## 2020-02-19 LAB
ANION GAP SERPL CALCULATED.3IONS-SCNC: 5 MMOL/L (ref 3–14)
BUN SERPL-MCNC: 18 MG/DL (ref 7–30)
CALCIUM SERPL-MCNC: 8.3 MG/DL (ref 8.5–10.1)
CHLORIDE SERPL-SCNC: 109 MMOL/L (ref 94–109)
CO2 SERPL-SCNC: 26 MMOL/L (ref 20–32)
COPATH REPORT: NORMAL
CREAT SERPL-MCNC: 1.49 MG/DL (ref 0.66–1.25)
ERYTHROCYTE [DISTWIDTH] IN BLOOD BY AUTOMATED COUNT: 15 % (ref 10–15)
GFR SERPL CREATININE-BSD FRML MDRD: 47 ML/MIN/{1.73_M2}
GLUCOSE BLDC GLUCOMTR-MCNC: 119 MG/DL (ref 70–99)
GLUCOSE SERPL-MCNC: 128 MG/DL (ref 70–99)
HCT VFR BLD AUTO: 31.6 % (ref 40–53)
HGB BLD-MCNC: 10.4 G/DL (ref 13.3–17.7)
MAGNESIUM SERPL-MCNC: 1.6 MG/DL (ref 1.6–2.3)
MCH RBC QN AUTO: 29 PG (ref 26.5–33)
MCHC RBC AUTO-ENTMCNC: 32.9 G/DL (ref 31.5–36.5)
MCV RBC AUTO: 88 FL (ref 78–100)
PHOSPHATE SERPL-MCNC: 3.5 MG/DL (ref 2.5–4.5)
PLATELET # BLD AUTO: 135 10E9/L (ref 150–450)
POTASSIUM SERPL-SCNC: 3.6 MMOL/L (ref 3.4–5.3)
RBC # BLD AUTO: 3.59 10E12/L (ref 4.4–5.9)
SODIUM SERPL-SCNC: 140 MMOL/L (ref 133–144)
WBC # BLD AUTO: 10.6 10E9/L (ref 4–11)

## 2020-02-19 PROCEDURE — 83735 ASSAY OF MAGNESIUM: CPT | Performed by: COLON & RECTAL SURGERY

## 2020-02-19 PROCEDURE — 80048 BASIC METABOLIC PNL TOTAL CA: CPT | Performed by: COLON & RECTAL SURGERY

## 2020-02-19 PROCEDURE — 25000128 H RX IP 250 OP 636: Performed by: COLON & RECTAL SURGERY

## 2020-02-19 PROCEDURE — 84100 ASSAY OF PHOSPHORUS: CPT | Performed by: COLON & RECTAL SURGERY

## 2020-02-19 PROCEDURE — 36415 COLL VENOUS BLD VENIPUNCTURE: CPT | Performed by: COLON & RECTAL SURGERY

## 2020-02-19 PROCEDURE — 12000000 ZZH R&B MED SURG/OB

## 2020-02-19 PROCEDURE — 25000132 ZZH RX MED GY IP 250 OP 250 PS 637: Performed by: COLON & RECTAL SURGERY

## 2020-02-19 PROCEDURE — 25800030 ZZH RX IP 258 OP 636: Performed by: COLON & RECTAL SURGERY

## 2020-02-19 PROCEDURE — 85027 COMPLETE CBC AUTOMATED: CPT | Performed by: COLON & RECTAL SURGERY

## 2020-02-19 PROCEDURE — 00000146 ZZHCL STATISTIC GLUCOSE BY METER IP

## 2020-02-19 RX ORDER — LORAZEPAM 0.5 MG/1
0.5 TABLET ORAL ONCE
Status: COMPLETED | OUTPATIENT
Start: 2020-02-19 | End: 2020-02-19

## 2020-02-19 RX ADMIN — ACETAMINOPHEN 975 MG: 325 TABLET, FILM COATED ORAL at 22:38

## 2020-02-19 RX ADMIN — CIPROFLOXACIN 400 MG: 2 INJECTION, SOLUTION INTRAVENOUS at 08:00

## 2020-02-19 RX ADMIN — METRONIDAZOLE 500 MG: 500 INJECTION, SOLUTION INTRAVENOUS at 00:44

## 2020-02-19 RX ADMIN — ACETAMINOPHEN 975 MG: 325 TABLET, FILM COATED ORAL at 07:59

## 2020-02-19 RX ADMIN — ENOXAPARIN SODIUM 40 MG: 40 INJECTION SUBCUTANEOUS at 10:01

## 2020-02-19 RX ADMIN — LORAZEPAM 0.5 MG: 0.5 TABLET ORAL at 20:29

## 2020-02-19 RX ADMIN — ACETAMINOPHEN 975 MG: 325 TABLET, FILM COATED ORAL at 17:05

## 2020-02-19 RX ADMIN — OXYCODONE HYDROCHLORIDE 5 MG: 5 TABLET ORAL at 04:21

## 2020-02-19 RX ADMIN — ATORVASTATIN CALCIUM 40 MG: 40 TABLET, FILM COATED ORAL at 22:38

## 2020-02-19 RX ADMIN — METOPROLOL SUCCINATE 100 MG: 100 TABLET, EXTENDED RELEASE ORAL at 20:29

## 2020-02-19 RX ADMIN — METRONIDAZOLE 500 MG: 500 INJECTION, SOLUTION INTRAVENOUS at 09:55

## 2020-02-19 ASSESSMENT — ACTIVITIES OF DAILY LIVING (ADL)
ADLS_ACUITY_SCORE: 13
ADLS_ACUITY_SCORE: 13
ADLS_ACUITY_SCORE: 11
ADLS_ACUITY_SCORE: 13

## 2020-02-19 ASSESSMENT — MIFFLIN-ST. JEOR: SCORE: 1756.92

## 2020-02-19 NOTE — PROGRESS NOTES
[facility]  PROGRESS NOTE    February 19, 2020  Post-op Day #1 robotic sigmoid colectomy    SUBJECTIVE:  Had one episode of nausea and emesis last night after eating pudding cup. Feeling better this morning. Tolerating CLD, no n/v. Trying pudding again this morning but eating it slowly. -flatus, -BM. Has stood at side of bed, but not ambulated. Pain controlled. Adequate UOP via Lan, yellow in color.    OBJECTIVE:  Temp:  [94.6  F (34.8  C)-98.4  F (36.9  C)] 98.4  F (36.9  C)  Pulse:  [55-86] 79  Heart Rate:  [56-91] 78  Resp:  [10-20] 18  BP: ()/() 122/72  MAP:  [94 mmHg-124 mmHg] 124 mmHg  Arterial Line BP: (134-179)/(64-87) 179/87  SpO2:  [91 %-100 %] 95 %    Intake/Output Summary (Last 24 hours) at 2/19/2020 0937  Last data filed at 2/19/2020 0642  Gross per 24 hour   Intake 2414 ml   Output 1405 ml   Net 1009 ml       GENERAL:  Awake, alert, no acute distress, lying in bed  HEAD: Normocephalic atraumatic  SCLERA: Anicteric  EXTREMITIES: Warm and well perfused  ABDOMEN:  Soft, appropriately tender, non-distended. No guarding, rigidity, or peritoneal signs.   INCISION:  C/d/i    LABS:  Lab Results   Component Value Date    WBC 10.6 02/19/2020     Lab Results   Component Value Date    HGB 10.4 02/19/2020     Lab Results   Component Value Date    HCT 31.6 02/19/2020     Lab Results   Component Value Date     02/19/2020     Last Basic Metabolic Panel:  Lab Results   Component Value Date     02/19/2020      Lab Results   Component Value Date    POTASSIUM 3.6 02/19/2020     Lab Results   Component Value Date    CHLORIDE 109 02/19/2020     Lab Results   Component Value Date    GEORGES 8.3 02/19/2020     Lab Results   Component Value Date    CO2 26 02/19/2020     Lab Results   Component Value Date    BUN 18 02/19/2020     Lab Results   Component Value Date    CR 1.49 02/19/2020     Lab Results   Component Value Date     02/19/2020       ASSESSMENT/PLAN: Simon is a 69 year old  male with PMHx of recurrent diverticulitis on chronic antibiotics, now s/p POD#1 robotic sigmoid colectomy.     1. Full liquid diet, advised to take it slow  2. PRN pain meds  3. Continue IVF  4. Discontinue Lan  5. OOB, ambulate  6. Await path   7. Lovenox for ppx    Plan discussed with Dr. Zuleta.    For questions/paging, please contact the CRS office at [phone].    Sanjuana Vivar PA-C  [facility]  Phone: [phone]    CRS Staff.  Seen and examined independently.  Agree with above.    Feeling better.  Tolerated lunch just fine.  Lan out.  Ambulate.  Plan for possible discharge tomorrow or Friday pending return of bowel function.    I performed a history and physical examination of the patient and discussed their management with the physician assistant. I reviewed the physician assistants note and agree with the documented findings and plan of care.     Ethan Zuleta MD  [facility]  [phone] (office)  [phone] (pager)  [email or web address]

## 2020-02-19 NOTE — PLAN OF CARE
VSS, Denies pain, tolerating full liquids, abdomen softly distended, Hyperactive BS, - gas. Denies nausea. Up ambulating in hallways. Lap sites WNL. IV SL.

## 2020-02-19 NOTE — PLAN OF CARE
VSS. A/O. Up-1. Abdo incisions CDI. -bs/gas. Dilaudid given. nauseated & vomit after a cup of pudding. Tolerating clears. Blood tinge urine montgomery.

## 2020-02-19 NOTE — PLAN OF CARE
Neuro: Aox4.     Respiratory: RA. Diminished LS.     Cardiac: WNL.     GI/: Bowel sounds audible x4 quads. BM 02/18. Adequate urine OP via coude.     Skin: Lap sites WNL.     Pain: Denies.     Mobility: Sbx1.     Diet: FL.    IVF: LR.    Plan of Care: Monitor pain; surgical sites.       Will continue to monitor.

## 2020-02-19 NOTE — PROGRESS NOTES
"SPIRITUAL HEALTH SERVICES  Spiritual Assessment Progress Note  [facility]  Referral   had a brief conversation with pt.  He stated, \"I don't need you!  I just want to get out of here!\"  Pt named his anxiety but declined offer to talk about it.     shared that SH is available.    No plans to follow.                                                                                                                                           Candy Garcia M.Div., [facility]  Staff    Pager [phone]  "

## 2020-02-20 LAB — GLUCOSE BLDC GLUCOMTR-MCNC: 110 MG/DL (ref 70–99)

## 2020-02-20 PROCEDURE — 12000000 ZZH R&B MED SURG/OB

## 2020-02-20 PROCEDURE — 25000132 ZZH RX MED GY IP 250 OP 250 PS 637: Performed by: COLON & RECTAL SURGERY

## 2020-02-20 PROCEDURE — 00000146 ZZHCL STATISTIC GLUCOSE BY METER IP

## 2020-02-20 PROCEDURE — 25000128 H RX IP 250 OP 636: Performed by: COLON & RECTAL SURGERY

## 2020-02-20 RX ORDER — TERAZOSIN 5 MG/1
5 CAPSULE ORAL
Status: DISCONTINUED | OUTPATIENT
Start: 2020-02-20 | End: 2020-02-21 | Stop reason: HOSPADM

## 2020-02-20 RX ORDER — LORAZEPAM 0.5 MG/1
.5-1 TABLET ORAL EVERY 6 HOURS PRN
Status: DISCONTINUED | OUTPATIENT
Start: 2020-02-20 | End: 2020-02-21 | Stop reason: HOSPADM

## 2020-02-20 RX ADMIN — TERAZOSIN HYDROCHLORIDE ANHYDROUS 5 MG: 5 CAPSULE ORAL at 20:57

## 2020-02-20 RX ADMIN — OXYCODONE HYDROCHLORIDE 5 MG: 5 TABLET ORAL at 23:00

## 2020-02-20 RX ADMIN — OXYCODONE HYDROCHLORIDE 5 MG: 5 TABLET ORAL at 02:44

## 2020-02-20 RX ADMIN — ACETAMINOPHEN 975 MG: 325 TABLET, FILM COATED ORAL at 17:15

## 2020-02-20 RX ADMIN — DIPHENHYDRAMINE HYDROCHLORIDE 12.5 MG: 50 INJECTION, SOLUTION INTRAMUSCULAR; INTRAVENOUS at 02:44

## 2020-02-20 RX ADMIN — ATORVASTATIN CALCIUM 40 MG: 40 TABLET, FILM COATED ORAL at 22:59

## 2020-02-20 RX ADMIN — LORAZEPAM 1 MG: 0.5 TABLET ORAL at 22:59

## 2020-02-20 RX ADMIN — ACETAMINOPHEN 975 MG: 325 TABLET, FILM COATED ORAL at 09:03

## 2020-02-20 RX ADMIN — METOPROLOL SUCCINATE 100 MG: 100 TABLET, EXTENDED RELEASE ORAL at 20:52

## 2020-02-20 RX ADMIN — ACETAMINOPHEN 975 MG: 325 TABLET, FILM COATED ORAL at 22:59

## 2020-02-20 ASSESSMENT — ACTIVITIES OF DAILY LIVING (ADL)
ADLS_ACUITY_SCORE: 13
ADLS_ACUITY_SCORE: 14

## 2020-02-20 NOTE — PLAN OF CARE
A&Ox4. Anxious, improved with ativan x1. Lap sites x4 bruised, liquid bandage. Pain managed with tylenol and oxy. +BS. -Flatus. Denies nausea. LS clear/dim. Up SBA/Ind. Full liquids. Voiding adequately

## 2020-02-21 VITALS
SYSTOLIC BLOOD PRESSURE: 121 MMHG | OXYGEN SATURATION: 93 % | HEIGHT: 74 IN | TEMPERATURE: 97.6 F | DIASTOLIC BLOOD PRESSURE: 70 MMHG | HEART RATE: 79 BPM | BODY MASS INDEX: 25.95 KG/M2 | RESPIRATION RATE: 18 BRPM | WEIGHT: 202.2 LBS

## 2020-02-21 LAB
ANION GAP SERPL CALCULATED.3IONS-SCNC: 4 MMOL/L (ref 3–14)
BUN SERPL-MCNC: 20 MG/DL (ref 7–30)
CALCIUM SERPL-MCNC: 8.9 MG/DL (ref 8.5–10.1)
CHLORIDE SERPL-SCNC: 111 MMOL/L (ref 94–109)
CO2 SERPL-SCNC: 27 MMOL/L (ref 20–32)
CREAT SERPL-MCNC: 1.31 MG/DL (ref 0.66–1.25)
CREAT SERPL-MCNC: 1.36 MG/DL (ref 0.66–1.25)
ERYTHROCYTE [DISTWIDTH] IN BLOOD BY AUTOMATED COUNT: 15 % (ref 10–15)
GFR SERPL CREATININE-BSD FRML MDRD: 53 ML/MIN/{1.73_M2}
GFR SERPL CREATININE-BSD FRML MDRD: 55 ML/MIN/{1.73_M2}
GLUCOSE SERPL-MCNC: 118 MG/DL (ref 70–99)
HCT VFR BLD AUTO: 31.5 % (ref 40–53)
HGB BLD-MCNC: 10.2 G/DL (ref 13.3–17.7)
MCH RBC QN AUTO: 29.1 PG (ref 26.5–33)
MCHC RBC AUTO-ENTMCNC: 32.4 G/DL (ref 31.5–36.5)
MCV RBC AUTO: 90 FL (ref 78–100)
PLATELET # BLD AUTO: 123 10E9/L (ref 150–450)
PLATELET # BLD AUTO: 131 10E9/L (ref 150–450)
POTASSIUM SERPL-SCNC: 3.7 MMOL/L (ref 3.4–5.3)
RBC # BLD AUTO: 3.51 10E12/L (ref 4.4–5.9)
SODIUM SERPL-SCNC: 142 MMOL/L (ref 133–144)
WBC # BLD AUTO: 8 10E9/L (ref 4–11)

## 2020-02-21 PROCEDURE — 85027 COMPLETE CBC AUTOMATED: CPT | Performed by: PHYSICIAN ASSISTANT

## 2020-02-21 PROCEDURE — 36415 COLL VENOUS BLD VENIPUNCTURE: CPT | Performed by: PHYSICIAN ASSISTANT

## 2020-02-21 PROCEDURE — 80048 BASIC METABOLIC PNL TOTAL CA: CPT | Performed by: PHYSICIAN ASSISTANT

## 2020-02-21 PROCEDURE — 82565 ASSAY OF CREATININE: CPT | Performed by: COLON & RECTAL SURGERY

## 2020-02-21 PROCEDURE — 36415 COLL VENOUS BLD VENIPUNCTURE: CPT | Performed by: COLON & RECTAL SURGERY

## 2020-02-21 PROCEDURE — 25000132 ZZH RX MED GY IP 250 OP 250 PS 637: Performed by: COLON & RECTAL SURGERY

## 2020-02-21 PROCEDURE — 85049 AUTOMATED PLATELET COUNT: CPT | Performed by: COLON & RECTAL SURGERY

## 2020-02-21 RX ORDER — OXYCODONE HYDROCHLORIDE 5 MG/1
5 TABLET ORAL EVERY 4 HOURS PRN
Qty: 12 TABLET | Refills: 0 | Status: SHIPPED | OUTPATIENT
Start: 2020-02-21 | End: 2022-11-25

## 2020-02-21 RX ADMIN — ACETAMINOPHEN 975 MG: 325 TABLET, FILM COATED ORAL at 07:04

## 2020-02-21 RX ADMIN — TERAZOSIN HYDROCHLORIDE ANHYDROUS 5 MG: 5 CAPSULE ORAL at 08:39

## 2020-02-21 ASSESSMENT — ACTIVITIES OF DAILY LIVING (ADL)
ADLS_ACUITY_SCORE: 14

## 2020-02-21 NOTE — PROGRESS NOTES
VSS. A/O. Ind. abdo incisions CDI. Tolerating diet. Clear urine now, no any blood residual. Had BMs. d'c paper works & script given to pt. Answered all concerns or questions.

## 2020-02-21 NOTE — PLAN OF CARE
Patient A&O, very anxious, needs reassurance. BP hypertensive this evening, call out to CRS. tolerating low fiber diet, up ambulating hallways, voiding, BM x 3, brown, loose.+ BS. Incisions WNL with exception to RLQ lap site bruising, unchanged. Voiding, IV SL.

## 2020-02-21 NOTE — PLAN OF CARE
A&Ox4. Anxious at times, improved with PO ativan. VSS on RA, ex HTN (improving with PTA meds). Lap sites x5 liquid bandage, bruised (especially lower right one). +BS. Denies Flatus. +BM, brown with red streaks. Voiding adequately. Pain managed with tylenol and oxy. LS diminished. Denies nausea. Low fiber diet. Up independently.     Voided at 7am. Per pt, urine blood tinged at first with a clots, then turned clear

## 2020-02-21 NOTE — PROVIDER NOTIFICATION
/107, patient concerned and anxious about blood pressure. Colorectal Surgery Notified. New orders to restart home dose of Hytrin and PRN Ativan order received.

## 2020-02-21 NOTE — PROGRESS NOTES
COLON & RECTAL SURGERY  PROGRESS NOTE    February 21, 2020  Post-op Day #3 robotic sigmoid colectomy    SUBJECTIVE: Anxious overnight, but slept well after given Ativan. Had episode of blood in urine, with clot, and then clear urine this morning. He is worried about this and would like to know why it is happening. Still having small amount of blood in the stool, however this is decreasing. He is tolerating LFD. No n/v. Ambulating.     OBJECTIVE:  Temp:  [97.4  F (36.3  C)-98.5  F (36.9  C)] 97.9  F (36.6  C)  Pulse:  [79-83] 79  Heart Rate:  [76-86] 77  Resp:  [18] 18  BP: (139-178)/() 157/92  SpO2:  [93 %-96 %] 93 %    Intake/Output Summary (Last 24 hours) at 2/21/2020 0846  Last data filed at 2/21/2020 0800  Gross per 24 hour   Intake 540 ml   Output 500 ml   Net 40 ml       GENERAL:  Awake, alert, no acute distress, sitting up in bed  HEAD: Normocephalic atraumatic  SCLERA: Anicteric  EXTREMITIES: Warm and well perfused  ABDOMEN:  Soft, appropriately tender, mild distention. No guarding, rigidity, or peritoneal signs.   INCISION:  C/d/i, ecchymosis around RLQ    LABS:  Lab Results   Component Value Date    WBC 10.6 02/19/2020     Lab Results   Component Value Date    HGB 10.4 02/19/2020     Lab Results   Component Value Date    HCT 31.6 02/19/2020     Lab Results   Component Value Date     02/21/2020     Last Basic Metabolic Panel:  Lab Results   Component Value Date     02/19/2020      Lab Results   Component Value Date    POTASSIUM 3.6 02/19/2020     Lab Results   Component Value Date    CHLORIDE 109 02/19/2020     Lab Results   Component Value Date    GEORGES 8.3 02/19/2020     Lab Results   Component Value Date    CO2 26 02/19/2020     Lab Results   Component Value Date    BUN 18 02/19/2020     Lab Results   Component Value Date    CR 1.36 02/21/2020     Lab Results   Component Value Date     02/19/2020       ASSESSMENT/PLAN: Simon is a 69 year old male with PMHx of recurrent  diverticulitis on chronic antibiotics, now s/p POD#3 robotic sigmoid colectomy. AVSS. Monitor for bloody stools or blood in urine. Will check labs this morning. If labs stable and no further blood in the urine, okay for patient to discharge this afternoon.    1. Low fiber diet  2. Prn pain meds  3. Discontinue IVF  4. Monitor urine output  5. OOB, ambulate  6. Hold Lovenox again today.    ADDENDUM: 10:10: Cr stable, actually improved from patient's baseline. Hgb stable. Okay to discharge today.     Plan discussed with Dr. Harper.    For questions/paging, please contact the CRS office at [phone].    Sanjuana Vivar PA-C  [facility]  Phone: [phone]

## 2020-02-24 NOTE — DISCHARGE SUMMARY
[facility] Discharge Summary      Simon Martines MRN# [id number]   Age: 69 year old YOB: 1950     Date of Admission:  2/18/2020  Date of Discharge::  2/21/2020  1:28 PM  Admitting Physician:  Ethan Zuleta MD  Discharge Physician:  Ethan Zuleta MD     PCP:  Henrry Scott    Disposition: Patient discharged from [facility] to home in stable condition.        Primary Diagnosis:   diverticulitis            Discharge Medications:     Discharge Medication List as of 2/21/2020 12:19 PM      START taking these medications    Details   oxyCODONE 5 MG PO tablet Take 1 tablet (5 mg) by mouth every 4 hours as needed for moderate to severe pain, Disp-12 tablet, R-0, Local Print         CONTINUE these medications which have NOT CHANGED    Details   acetaminophen (TYLENOL) 500 MG tablet Take 500 mg by mouth 2 times daily as needed for mild pain , Historical      atorvastatin (LIPITOR) 40 MG tablet Take 40 mg by mouth At Bedtime, Historical      diphenhydrAMINE (BENADRYL) 25 MG tablet Take 25 mg by mouth daily as needed for allergies or sleep (spring and fall allergies) , Historical      furosemide (LASIX) 40 MG tablet Take 40 mg by mouth daily, Historical      metoprolol succinate ER (TOPROL-XL) 100 MG 24 hr tablet Take 100 mg by mouth daily, Historical      Probiotic Product (PROBIOTIC-10 PO) Take 1 capsule by mouth 2 times daily, Historical      terazosin (HYTRIN) 5 MG capsule Take 5 mg by mouth 2 times daily, Historical      warfarin ANTICOAGULANT (COUMADIN) 1 MG tablet Take 3-4 mg by mouth daily Take:  On Wednesday and Saturday 4 X 1mg = 4 mg dose.  On Monday,Tuesday,Thursday, Friday and Sunday 3 X 1 mg = 3 mg dose., Historical         STOP taking these medications       enoxaparin ANTICOAGULANT (LOVENOX) 100 MG/ML syringe Comments:   Reason for Stopping:         levofloxacin (LEVAQUIN) 500 MG tablet Comments:   Reason for Stopping:         metroNIDAZOLE (FLAGYL) 250 MG  tablet Comments:   Reason for Stopping:                      Follow Up, Special Instructions:     Discharge diet: Low residue   Discharge activity: Lifting restricted to 10 pounds   Discharge follow-up: Follow up with Dr. Zuleta at previously scheduled POV   Wound care: May get incision wet in shower but do not soak or scrub              Procedures:     Procedure(s): Robotic assisted sigmoid colectomy       No other procedures performed during this admission            Consultations:   none          Brief Hospital Summary:     Patient is a 69 year old male who underwent robotic sigmoid colectomy on 2/18/20 by Dr. Zuleta.   There were no immediate complications during this procedure.    Please refer to the full operative summary for details.  The patient's hospital course was unremarkable.  Pain was controlled on oral pain regimen.  He was tolerating a low fiber diet.  Bowel function had returned prior to discharge.  He recovered as anticipated and experienced no post-operative complications.         Attestation:  I have reviewed today's vital signs, notes, medications, labs and imaging.    Sanjuana Vivar PA-C  [facility]          ADDENDUM:  Length of stay: 3 days  Indicate Y or N for the following:  UTI  No  C diff  No  PNA  No  SSI No  DVT No  PE  No  CVA No  MI No  Enterocutaneous fistula  No  Peripheral nerve injury  No  Abscess (not adjacent to anastomosis)  No  Leak No    Treated with:   Antibiotics NO   Drain  NO   Reoperation  NO  Death within 30 days No  Reintubation  No  Reoperation  No   Procedure n/a

## 2022-07-18 ENCOUNTER — TRANSFERRED RECORDS (OUTPATIENT)
Dept: HEALTH INFORMATION MANAGEMENT | Facility: CLINIC | Age: 72
End: 2022-07-18

## 2022-11-25 ENCOUNTER — HOSPITAL ENCOUNTER (INPATIENT)
Facility: CLINIC | Age: 72
LOS: 3 days | Discharge: HOME | DRG: 660 | End: 2022-11-28
Attending: EMERGENCY MEDICINE | Admitting: HOSPITALIST
Payer: COMMERCIAL

## 2022-11-25 ENCOUNTER — APPOINTMENT (OUTPATIENT)
Dept: CT IMAGING | Facility: CLINIC | Age: 72
DRG: 660 | End: 2022-11-25
Attending: EMERGENCY MEDICINE
Payer: COMMERCIAL

## 2022-11-25 DIAGNOSIS — N17.9 ACUTE RENAL FAILURE SUPERIMPOSED ON STAGE 3B CHRONIC KIDNEY DISEASE, UNSPECIFIED ACUTE RENAL FAILURE TYPE (H): ICD-10-CM

## 2022-11-25 DIAGNOSIS — Z90.5 S/P NEPHRECTOMY: ICD-10-CM

## 2022-11-25 DIAGNOSIS — Z98.890 POSTOPERATIVE STATE: ICD-10-CM

## 2022-11-25 DIAGNOSIS — Z85.528 HISTORY OF RENAL CARCINOMA: ICD-10-CM

## 2022-11-25 DIAGNOSIS — N18.32 ACUTE RENAL FAILURE SUPERIMPOSED ON STAGE 3B CHRONIC KIDNEY DISEASE, UNSPECIFIED ACUTE RENAL FAILURE TYPE (H): ICD-10-CM

## 2022-11-25 LAB
ALBUMIN SERPL-MCNC: 3 G/DL (ref 3.4–5)
ALP SERPL-CCNC: 74 U/L (ref 40–150)
ALT SERPL W P-5'-P-CCNC: 12 U/L (ref 0–70)
ANION GAP SERPL CALCULATED.3IONS-SCNC: 9 MMOL/L (ref 3–14)
AST SERPL W P-5'-P-CCNC: 11 U/L (ref 0–45)
BASOPHILS # BLD AUTO: 0 10E3/UL (ref 0–0.2)
BASOPHILS NFR BLD AUTO: 1 %
BILIRUB SERPL-MCNC: 0.8 MG/DL (ref 0.2–1.3)
BUN SERPL-MCNC: 65 MG/DL (ref 7–30)
CALCIUM SERPL-MCNC: 8.3 MG/DL (ref 8.5–10.1)
CHLORIDE BLD-SCNC: 102 MMOL/L (ref 94–109)
CO2 SERPL-SCNC: 23 MMOL/L (ref 20–32)
CREAT SERPL-MCNC: 8.16 MG/DL (ref 0.66–1.25)
EOSINOPHIL # BLD AUTO: 0.1 10E3/UL (ref 0–0.7)
EOSINOPHIL NFR BLD AUTO: 2 %
ERYTHROCYTE [DISTWIDTH] IN BLOOD BY AUTOMATED COUNT: 14.7 % (ref 10–15)
GFR SERPL CREATININE-BSD FRML MDRD: 6 ML/MIN/1.73M2
GLUCOSE BLD-MCNC: 123 MG/DL (ref 70–99)
HCT VFR BLD AUTO: 30.5 % (ref 40–53)
HGB BLD-MCNC: 9.9 G/DL (ref 13.3–17.7)
HOLD SPECIMEN: NORMAL
HOLD SPECIMEN: NORMAL
IMM GRANULOCYTES # BLD: 0 10E3/UL
IMM GRANULOCYTES NFR BLD: 0 %
INR PPP: 1.95 (ref 0.85–1.15)
LIPASE SERPL-CCNC: 450 U/L (ref 73–393)
LYMPHOCYTES # BLD AUTO: 1.2 10E3/UL (ref 0.8–5.3)
LYMPHOCYTES NFR BLD AUTO: 13 %
MCH RBC QN AUTO: 28.9 PG (ref 26.5–33)
MCHC RBC AUTO-ENTMCNC: 32.5 G/DL (ref 31.5–36.5)
MCV RBC AUTO: 89 FL (ref 78–100)
MONOCYTES # BLD AUTO: 0.8 10E3/UL (ref 0–1.3)
MONOCYTES NFR BLD AUTO: 9 %
NEUTROPHILS # BLD AUTO: 6.7 10E3/UL (ref 1.6–8.3)
NEUTROPHILS NFR BLD AUTO: 75 %
NRBC # BLD AUTO: 0 10E3/UL
NRBC BLD AUTO-RTO: 0 /100
PLATELET # BLD AUTO: 171 10E3/UL (ref 150–450)
POTASSIUM BLD-SCNC: 4.4 MMOL/L (ref 3.4–5.3)
PROT SERPL-MCNC: 6.9 G/DL (ref 6.8–8.8)
RBC # BLD AUTO: 3.43 10E6/UL (ref 4.4–5.9)
SODIUM SERPL-SCNC: 134 MMOL/L (ref 133–144)
WBC # BLD AUTO: 8.8 10E3/UL (ref 4–11)

## 2022-11-25 PROCEDURE — 120N000001 HC R&B MED SURG/OB

## 2022-11-25 PROCEDURE — 83690 ASSAY OF LIPASE: CPT | Performed by: EMERGENCY MEDICINE

## 2022-11-25 PROCEDURE — 80053 COMPREHEN METABOLIC PANEL: CPT | Performed by: EMERGENCY MEDICINE

## 2022-11-25 PROCEDURE — 74176 CT ABD & PELVIS W/O CONTRAST: CPT

## 2022-11-25 PROCEDURE — 85610 PROTHROMBIN TIME: CPT | Performed by: EMERGENCY MEDICINE

## 2022-11-25 PROCEDURE — 99223 1ST HOSP IP/OBS HIGH 75: CPT | Mod: AI | Performed by: HOSPITALIST

## 2022-11-25 PROCEDURE — 99285 EMERGENCY DEPT VISIT HI MDM: CPT | Mod: 25

## 2022-11-25 PROCEDURE — 36415 COLL VENOUS BLD VENIPUNCTURE: CPT | Performed by: EMERGENCY MEDICINE

## 2022-11-25 PROCEDURE — 250N000009 HC RX 250: Performed by: EMERGENCY MEDICINE

## 2022-11-25 PROCEDURE — 85004 AUTOMATED DIFF WBC COUNT: CPT | Performed by: EMERGENCY MEDICINE

## 2022-11-25 PROCEDURE — 258N000003 HC RX IP 258 OP 636: Performed by: EMERGENCY MEDICINE

## 2022-11-25 PROCEDURE — 87040 BLOOD CULTURE FOR BACTERIA: CPT | Performed by: EMERGENCY MEDICINE

## 2022-11-25 RX ORDER — LIDOCAINE HYDROCHLORIDE 20 MG/ML
10 JELLY TOPICAL ONCE
Status: COMPLETED | OUTPATIENT
Start: 2022-11-25 | End: 2022-11-25

## 2022-11-25 RX ADMIN — LIDOCAINE HYDROCHLORIDE 10 ML: 20 JELLY TOPICAL at 23:32

## 2022-11-25 RX ADMIN — SODIUM CHLORIDE 1000 ML: 9 INJECTION, SOLUTION INTRAVENOUS at 23:31

## 2022-11-25 ASSESSMENT — ACTIVITIES OF DAILY LIVING (ADL): ADLS_ACUITY_SCORE: 35

## 2022-11-25 NOTE — ED TRIAGE NOTES
Pt with Hx of kidney stones an kidney Ca presents with urinary retention. Last void 1900 last night. Also complains of chills and diarrhea      Triage Assessment     Row Name 11/25/22 1411       Triage Assessment (Adult)    Airway WDL WDL       Respiratory WDL    Respiratory WDL WDL       Skin Circulation/Temperature WDL    Skin Circulation/Temperature WDL WDL       Cardiac WDL    Cardiac WDL WDL       Peripheral/Neurovascular WDL    Peripheral Neurovascular WDL WDL       Cognitive/Neuro/Behavioral WDL    Cognitive/Neuro/Behavioral WDL WDL

## 2022-11-25 NOTE — ED NOTES
"PIT/Triage Evaluation    Patient presented with lack of urination since last night around 7pm and slight left flank discomfort that has been \"slowly coming on.\" He states that last night, he had sudden onset of \"shaking chills\" after he noticed he was unable to void. He states he has not felt the need to urinate but he is concerned that he has not voided since yesterday. He also notes nonbloody diarrhea since yesterday. Of note, he has history of kidney stones and kidney cancer. His right kidney has been completely removed. He had a stent removed a couple of weeks ago and a cryoablation for left kidney cancer performed 2 months ago. He denies vomiting and fever. He further denies penile pain or pain to the abdomen, testicles.    Exam is notable for minimal tenderness.    Appropriate interventions for symptom management were initiated if applicable.  Appropriate diagnostic tests were initiated if indicated.    Important information for subsequent clinician : likely needs CT but waiting on Cr; tech to do bladder scan, does not have urge to urinate    I briefly evaluated the patient and developed an initial plan of care. I discussed this plan and explained that this brief interaction does not constitute a full evaluation. Patient/family understands that they should wait to be fully evaluated and discuss any test results with another clinician prior to leaving the hospital.       Arley Willams MD  11/25/22 1803    "

## 2022-11-25 NOTE — LETTER
Transition Communication Hand-off for Care Transitions to Next Level of Care Provider    Name: Simon Martines  : 1950  MRN #: [id number]  Primary Care Provider: Henrry Scott     Primary Clinic: [facility] [address]  [facility] [address]     Reason for Hospitalization:  S/p nephrectomy [Z90.5]  Postoperative state [Z98.890]  History of renal carcinoma [Z85.528]  Acute renal failure superimposed on stage 3b chronic kidney disease, unspecified acute renal failure type (H) [N17.9, N18.32]  Admit Date/Time: 2022  9:23 PM  Discharge Date: 22  Payor Source: Payor: MEDICA / Plan: [employer] / Product Type: HMO /     Readmission Assessment Measure (AMBER) Risk Score/category: elevated           Reason for Communication Hand-off Referral: Multiple providers/specialties    Discharge Plan: discharge with PO antx plan for repeat BMP  with results to Nephrologist.  Patient will require continued follow up with [facility].         Concern for non-adherence with plan of care:   Y/N n  Discharge Needs Assessment:  Needs    Flowsheet Row Most Recent Value   Equipment Currently Used at Home none          Already enrolled in Tele-monitoring program and name of program:  n/a  Follow-up specialty is recommended: Yes    Follow-up plan:  No future appointments.    Any outstanding tests or procedures:              Key Recommendations:    Patient will require repeat bloodwork for kidney function with results to Dr. Jackson (Nephrologist). Patient will require continued recommendations for Urology with [facility]   Meghan Patiño RN    AVS/Discharge Summary is the source of truth; this is a helpful guide for improved communication of patient story

## 2022-11-25 NOTE — ED NOTES
Patient reports had surgery on his kidney, Cryoablation for cancer. States unable to urinate since yesterday. Last void at 1900 yesterday evening. Denies blood in urine.

## 2022-11-26 ENCOUNTER — APPOINTMENT (OUTPATIENT)
Dept: GENERAL RADIOLOGY | Facility: CLINIC | Age: 72
DRG: 660 | End: 2022-11-26
Attending: HOSPITALIST
Payer: COMMERCIAL

## 2022-11-26 ENCOUNTER — ANESTHESIA EVENT (OUTPATIENT)
Dept: SURGERY | Facility: CLINIC | Age: 72
DRG: 660 | End: 2022-11-26
Payer: COMMERCIAL

## 2022-11-26 ENCOUNTER — ANESTHESIA (OUTPATIENT)
Dept: SURGERY | Facility: CLINIC | Age: 72
DRG: 660 | End: 2022-11-26
Payer: COMMERCIAL

## 2022-11-26 LAB
ALBUMIN SERPL-MCNC: 2.7 G/DL (ref 3.4–5)
ALBUMIN UR-MCNC: 10 MG/DL
ALP SERPL-CCNC: 62 U/L (ref 40–150)
ALT SERPL W P-5'-P-CCNC: 10 U/L (ref 0–70)
ANION GAP SERPL CALCULATED.3IONS-SCNC: 11 MMOL/L (ref 3–14)
APPEARANCE UR: CLEAR
AST SERPL W P-5'-P-CCNC: 7 U/L (ref 0–45)
BACTERIA #/AREA URNS HPF: ABNORMAL /HPF
BILIRUB SERPL-MCNC: 0.7 MG/DL (ref 0.2–1.3)
BILIRUB UR QL STRIP: NEGATIVE
BUN SERPL-MCNC: 71 MG/DL (ref 7–30)
CALCIUM SERPL-MCNC: 8 MG/DL (ref 8.5–10.1)
CHLORIDE BLD-SCNC: 105 MMOL/L (ref 94–109)
CO2 SERPL-SCNC: 20 MMOL/L (ref 20–32)
COLOR UR AUTO: ABNORMAL
CREAT SERPL-MCNC: 9.79 MG/DL (ref 0.66–1.25)
ERYTHROCYTE [DISTWIDTH] IN BLOOD BY AUTOMATED COUNT: 14.6 % (ref 10–15)
GFR SERPL CREATININE-BSD FRML MDRD: 5 ML/MIN/1.73M2
GLUCOSE BLD-MCNC: 103 MG/DL (ref 70–99)
GLUCOSE BLDC GLUCOMTR-MCNC: 100 MG/DL (ref 70–99)
GLUCOSE UR STRIP-MCNC: NEGATIVE MG/DL
HCT VFR BLD AUTO: 28.6 % (ref 40–53)
HGB BLD-MCNC: 9 G/DL (ref 13.3–17.7)
HGB UR QL STRIP: ABNORMAL
INR PPP: 2.13 (ref 0.85–1.15)
KETONES UR STRIP-MCNC: NEGATIVE MG/DL
LEUKOCYTE ESTERASE UR QL STRIP: ABNORMAL
MCH RBC QN AUTO: 28.4 PG (ref 26.5–33)
MCHC RBC AUTO-ENTMCNC: 31.5 G/DL (ref 31.5–36.5)
MCV RBC AUTO: 90 FL (ref 78–100)
NITRATE UR QL: NEGATIVE
PH UR STRIP: 5.5 [PH] (ref 5–7)
PLATELET # BLD AUTO: 159 10E3/UL (ref 150–450)
POTASSIUM BLD-SCNC: 4.7 MMOL/L (ref 3.4–5.3)
PROT SERPL-MCNC: 6.2 G/DL (ref 6.8–8.8)
RBC # BLD AUTO: 3.17 10E6/UL (ref 4.4–5.9)
RBC URINE: 85 /HPF
SARS-COV-2 RNA RESP QL NAA+PROBE: NEGATIVE
SODIUM SERPL-SCNC: 136 MMOL/L (ref 133–144)
SP GR UR STRIP: 1.01 (ref 1–1.03)
UROBILINOGEN UR STRIP-MCNC: NORMAL MG/DL
WBC # BLD AUTO: 9.3 10E3/UL (ref 4–11)
WBC URINE: 11 /HPF

## 2022-11-26 PROCEDURE — 36415 COLL VENOUS BLD VENIPUNCTURE: CPT | Performed by: HOSPITALIST

## 2022-11-26 PROCEDURE — 99221 1ST HOSP IP/OBS SF/LOW 40: CPT | Performed by: INTERNAL MEDICINE

## 2022-11-26 PROCEDURE — C1769 GUIDE WIRE: HCPCS | Performed by: UROLOGY

## 2022-11-26 PROCEDURE — 258N000003 HC RX IP 258 OP 636: Performed by: HOSPITALIST

## 2022-11-26 PROCEDURE — 272N000001 HC OR GENERAL SUPPLY STERILE: Performed by: UROLOGY

## 2022-11-26 PROCEDURE — 999N000179 XR SURGERY CARM FLUORO LESS THAN 5 MIN W STILLS

## 2022-11-26 PROCEDURE — 999N000141 HC STATISTIC PRE-PROCEDURE NURSING ASSESSMENT: Performed by: UROLOGY

## 2022-11-26 PROCEDURE — 370N000017 HC ANESTHESIA TECHNICAL FEE, PER MIN: Performed by: UROLOGY

## 2022-11-26 PROCEDURE — 87149 DNA/RNA DIRECT PROBE: CPT | Performed by: EMERGENCY MEDICINE

## 2022-11-26 PROCEDURE — C2617 STENT, NON-COR, TEM W/O DEL: HCPCS | Performed by: UROLOGY

## 2022-11-26 PROCEDURE — 81001 URINALYSIS AUTO W/SCOPE: CPT | Performed by: EMERGENCY MEDICINE

## 2022-11-26 PROCEDURE — 87077 CULTURE AEROBIC IDENTIFY: CPT | Performed by: EMERGENCY MEDICINE

## 2022-11-26 PROCEDURE — 36415 COLL VENOUS BLD VENIPUNCTURE: CPT

## 2022-11-26 PROCEDURE — 120N000001 HC R&B MED SURG/OB

## 2022-11-26 PROCEDURE — 36415 COLL VENOUS BLD VENIPUNCTURE: CPT | Performed by: EMERGENCY MEDICINE

## 2022-11-26 PROCEDURE — 710N000009 HC RECOVERY PHASE 1, LEVEL 1, PER MIN: Performed by: UROLOGY

## 2022-11-26 PROCEDURE — 0T778DZ DILATION OF LEFT URETER WITH INTRALUMINAL DEVICE, VIA NATURAL OR ARTIFICIAL OPENING ENDOSCOPIC: ICD-10-PCS | Performed by: UROLOGY

## 2022-11-26 PROCEDURE — 250N000009 HC RX 250: Performed by: HOSPITALIST

## 2022-11-26 PROCEDURE — 250N000025 HC SEVOFLURANE, PER MIN: Performed by: UROLOGY

## 2022-11-26 PROCEDURE — 85014 HEMATOCRIT: CPT | Performed by: HOSPITALIST

## 2022-11-26 PROCEDURE — 99233 SBSQ HOSP IP/OBS HIGH 50: CPT | Performed by: INTERNAL MEDICINE

## 2022-11-26 PROCEDURE — U0003 INFECTIOUS AGENT DETECTION BY NUCLEIC ACID (DNA OR RNA); SEVERE ACUTE RESPIRATORY SYNDROME CORONAVIRUS 2 (SARS-COV-2) (CORONAVIRUS DISEASE [COVID-19]), AMPLIFIED PROBE TECHNIQUE, MAKING USE OF HIGH THROUGHPUT TECHNOLOGIES AS DESCRIBED BY CMS-2020-01-R: HCPCS | Performed by: EMERGENCY MEDICINE

## 2022-11-26 PROCEDURE — 258N000002 HC RX IP 258 OP 250: Performed by: INTERNAL MEDICINE

## 2022-11-26 PROCEDURE — 250N000009 HC RX 250: Performed by: NURSE ANESTHETIST, CERTIFIED REGISTERED

## 2022-11-26 PROCEDURE — 258N000003 HC RX IP 258 OP 636: Performed by: ANESTHESIOLOGY

## 2022-11-26 PROCEDURE — 360N000075 HC SURGERY LEVEL 2, PER MIN: Performed by: UROLOGY

## 2022-11-26 PROCEDURE — C9803 HOPD COVID-19 SPEC COLLECT: HCPCS

## 2022-11-26 PROCEDURE — 82040 ASSAY OF SERUM ALBUMIN: CPT | Performed by: HOSPITALIST

## 2022-11-26 PROCEDURE — 80053 COMPREHEN METABOLIC PANEL: CPT | Performed by: HOSPITALIST

## 2022-11-26 PROCEDURE — 250N000011 HC RX IP 250 OP 636: Performed by: NURSE ANESTHETIST, CERTIFIED REGISTERED

## 2022-11-26 PROCEDURE — 85610 PROTHROMBIN TIME: CPT

## 2022-11-26 PROCEDURE — 250N000011 HC RX IP 250 OP 636: Performed by: HOSPITALIST

## 2022-11-26 PROCEDURE — 250N000013 HC RX MED GY IP 250 OP 250 PS 637: Performed by: HOSPITALIST

## 2022-11-26 DEVICE — URETERAL STENT
Type: IMPLANTABLE DEVICE | Site: URETHRA | Status: FUNCTIONAL
Brand: POLARIS™ ULTRA

## 2022-11-26 RX ORDER — SODIUM CHLORIDE 9 MG/ML
INJECTION, SOLUTION INTRAVENOUS CONTINUOUS
Status: DISCONTINUED | OUTPATIENT
Start: 2022-11-26 | End: 2022-11-26

## 2022-11-26 RX ORDER — PROPOFOL 10 MG/ML
INJECTION, EMULSION INTRAVENOUS PRN
Status: DISCONTINUED | OUTPATIENT
Start: 2022-11-26 | End: 2022-11-26

## 2022-11-26 RX ORDER — LIDOCAINE 40 MG/G
CREAM TOPICAL
Status: DISCONTINUED | OUTPATIENT
Start: 2022-11-26 | End: 2022-11-26

## 2022-11-26 RX ORDER — CIPROFLOXACIN 2 MG/ML
400 INJECTION, SOLUTION INTRAVENOUS
Status: DISCONTINUED | OUTPATIENT
Start: 2022-11-26 | End: 2022-11-26

## 2022-11-26 RX ORDER — WARFARIN SODIUM 1 MG/1
3-4 TABLET ORAL DAILY
Status: DISCONTINUED | OUTPATIENT
Start: 2022-11-26 | End: 2022-11-26

## 2022-11-26 RX ORDER — FENTANYL CITRATE 0.05 MG/ML
50 INJECTION, SOLUTION INTRAMUSCULAR; INTRAVENOUS EVERY 5 MIN PRN
Status: DISCONTINUED | OUTPATIENT
Start: 2022-11-26 | End: 2022-11-26 | Stop reason: HOSPADM

## 2022-11-26 RX ORDER — ACETAMINOPHEN 325 MG/1
650 TABLET ORAL EVERY 4 HOURS PRN
Status: DISCONTINUED | OUTPATIENT
Start: 2022-11-29 | End: 2022-11-28 | Stop reason: HOSPADM

## 2022-11-26 RX ORDER — HYDROMORPHONE HCL IN WATER/PF 6 MG/30 ML
0.4 PATIENT CONTROLLED ANALGESIA SYRINGE INTRAVENOUS EVERY 5 MIN PRN
Status: DISCONTINUED | OUTPATIENT
Start: 2022-11-26 | End: 2022-11-26 | Stop reason: HOSPADM

## 2022-11-26 RX ORDER — FENTANYL CITRATE 0.05 MG/ML
25 INJECTION, SOLUTION INTRAMUSCULAR; INTRAVENOUS EVERY 5 MIN PRN
Status: DISCONTINUED | OUTPATIENT
Start: 2022-11-26 | End: 2022-11-26 | Stop reason: HOSPADM

## 2022-11-26 RX ORDER — DEXAMETHASONE SODIUM PHOSPHATE 4 MG/ML
INJECTION, SOLUTION INTRA-ARTICULAR; INTRALESIONAL; INTRAMUSCULAR; INTRAVENOUS; SOFT TISSUE PRN
Status: DISCONTINUED | OUTPATIENT
Start: 2022-11-26 | End: 2022-11-26

## 2022-11-26 RX ORDER — LANOLIN ALCOHOL/MO/W.PET/CERES
3 CREAM (GRAM) TOPICAL
Status: DISCONTINUED | OUTPATIENT
Start: 2022-11-26 | End: 2022-11-28 | Stop reason: HOSPADM

## 2022-11-26 RX ORDER — ONDANSETRON 2 MG/ML
4 INJECTION INTRAMUSCULAR; INTRAVENOUS EVERY 6 HOURS PRN
Status: DISCONTINUED | OUTPATIENT
Start: 2022-11-26 | End: 2022-11-28 | Stop reason: HOSPADM

## 2022-11-26 RX ORDER — SODIUM CHLORIDE 9 MG/ML
INJECTION, SOLUTION INTRAVENOUS CONTINUOUS
Status: DISCONTINUED | OUTPATIENT
Start: 2022-11-26 | End: 2022-11-26 | Stop reason: HOSPADM

## 2022-11-26 RX ORDER — NALOXONE HYDROCHLORIDE 0.4 MG/ML
0.4 INJECTION, SOLUTION INTRAMUSCULAR; INTRAVENOUS; SUBCUTANEOUS
Status: DISCONTINUED | OUTPATIENT
Start: 2022-11-26 | End: 2022-11-28 | Stop reason: HOSPADM

## 2022-11-26 RX ORDER — ONDANSETRON 2 MG/ML
4 INJECTION INTRAMUSCULAR; INTRAVENOUS EVERY 6 HOURS PRN
Status: DISCONTINUED | OUTPATIENT
Start: 2022-11-26 | End: 2022-11-26

## 2022-11-26 RX ORDER — BISACODYL 10 MG
10 SUPPOSITORY, RECTAL RECTAL DAILY PRN
Status: DISCONTINUED | OUTPATIENT
Start: 2022-11-26 | End: 2022-11-28 | Stop reason: HOSPADM

## 2022-11-26 RX ORDER — METOPROLOL SUCCINATE 100 MG/1
100 TABLET, EXTENDED RELEASE ORAL DAILY
Status: DISCONTINUED | OUTPATIENT
Start: 2022-11-26 | End: 2022-11-28 | Stop reason: HOSPADM

## 2022-11-26 RX ORDER — TERAZOSIN 5 MG/1
5 CAPSULE ORAL
Status: DISCONTINUED | OUTPATIENT
Start: 2022-11-26 | End: 2022-11-28 | Stop reason: HOSPADM

## 2022-11-26 RX ORDER — ACETAMINOPHEN 650 MG/1
650 SUPPOSITORY RECTAL EVERY 6 HOURS PRN
Status: DISCONTINUED | OUTPATIENT
Start: 2022-11-26 | End: 2022-11-26

## 2022-11-26 RX ORDER — ONDANSETRON 4 MG/1
4 TABLET, ORALLY DISINTEGRATING ORAL EVERY 6 HOURS PRN
Status: DISCONTINUED | OUTPATIENT
Start: 2022-11-26 | End: 2022-11-28 | Stop reason: HOSPADM

## 2022-11-26 RX ORDER — ONDANSETRON 4 MG/1
4 TABLET, ORALLY DISINTEGRATING ORAL EVERY 30 MIN PRN
Status: DISCONTINUED | OUTPATIENT
Start: 2022-11-26 | End: 2022-11-26 | Stop reason: HOSPADM

## 2022-11-26 RX ORDER — AMOXICILLIN 250 MG
1 CAPSULE ORAL 2 TIMES DAILY PRN
Status: DISCONTINUED | OUTPATIENT
Start: 2022-11-26 | End: 2022-11-28 | Stop reason: HOSPADM

## 2022-11-26 RX ORDER — CEFTRIAXONE 1 G/1
1 INJECTION, POWDER, FOR SOLUTION INTRAMUSCULAR; INTRAVENOUS EVERY EVENING
Status: DISCONTINUED | OUTPATIENT
Start: 2022-11-26 | End: 2022-11-28 | Stop reason: HOSPADM

## 2022-11-26 RX ORDER — ATORVASTATIN CALCIUM 40 MG/1
40 TABLET, FILM COATED ORAL AT BEDTIME
Status: DISCONTINUED | OUTPATIENT
Start: 2022-11-26 | End: 2022-11-28 | Stop reason: HOSPADM

## 2022-11-26 RX ORDER — SODIUM CHLORIDE 450 MG/100ML
INJECTION, SOLUTION INTRAVENOUS CONTINUOUS
Status: DISCONTINUED | OUTPATIENT
Start: 2022-11-26 | End: 2022-11-28 | Stop reason: HOSPADM

## 2022-11-26 RX ORDER — LIDOCAINE 40 MG/G
CREAM TOPICAL
Status: DISCONTINUED | OUTPATIENT
Start: 2022-11-26 | End: 2022-11-28 | Stop reason: HOSPADM

## 2022-11-26 RX ORDER — NALOXONE HYDROCHLORIDE 0.4 MG/ML
0.2 INJECTION, SOLUTION INTRAMUSCULAR; INTRAVENOUS; SUBCUTANEOUS
Status: DISCONTINUED | OUTPATIENT
Start: 2022-11-26 | End: 2022-11-28 | Stop reason: HOSPADM

## 2022-11-26 RX ORDER — WARFARIN SODIUM 4 MG/1
4 TABLET ORAL ONCE
Status: DISCONTINUED | OUTPATIENT
Start: 2022-11-26 | End: 2022-11-26

## 2022-11-26 RX ORDER — LIDOCAINE HYDROCHLORIDE 20 MG/ML
INJECTION, SOLUTION INFILTRATION; PERINEURAL PRN
Status: DISCONTINUED | OUTPATIENT
Start: 2022-11-26 | End: 2022-11-26

## 2022-11-26 RX ORDER — AMOXICILLIN 250 MG
2 CAPSULE ORAL 2 TIMES DAILY PRN
Status: DISCONTINUED | OUTPATIENT
Start: 2022-11-26 | End: 2022-11-28 | Stop reason: HOSPADM

## 2022-11-26 RX ORDER — ACETAMINOPHEN 325 MG/1
975 TABLET ORAL EVERY 8 HOURS
Status: DISCONTINUED | OUTPATIENT
Start: 2022-11-26 | End: 2022-11-28 | Stop reason: HOSPADM

## 2022-11-26 RX ORDER — HYDROMORPHONE HCL IN WATER/PF 6 MG/30 ML
0.2 PATIENT CONTROLLED ANALGESIA SYRINGE INTRAVENOUS
Status: DISCONTINUED | OUTPATIENT
Start: 2022-11-26 | End: 2022-11-28 | Stop reason: HOSPADM

## 2022-11-26 RX ORDER — HYDROMORPHONE HCL IN WATER/PF 6 MG/30 ML
0.1 PATIENT CONTROLLED ANALGESIA SYRINGE INTRAVENOUS
Status: DISCONTINUED | OUTPATIENT
Start: 2022-11-26 | End: 2022-11-28 | Stop reason: HOSPADM

## 2022-11-26 RX ORDER — ONDANSETRON 2 MG/ML
4 INJECTION INTRAMUSCULAR; INTRAVENOUS EVERY 30 MIN PRN
Status: DISCONTINUED | OUTPATIENT
Start: 2022-11-26 | End: 2022-11-26 | Stop reason: HOSPADM

## 2022-11-26 RX ORDER — PROCHLORPERAZINE MALEATE 5 MG
5 TABLET ORAL EVERY 6 HOURS PRN
Status: DISCONTINUED | OUTPATIENT
Start: 2022-11-26 | End: 2022-11-28 | Stop reason: HOSPADM

## 2022-11-26 RX ORDER — ONDANSETRON 4 MG/1
4 TABLET, ORALLY DISINTEGRATING ORAL EVERY 6 HOURS PRN
Status: DISCONTINUED | OUTPATIENT
Start: 2022-11-26 | End: 2022-11-26

## 2022-11-26 RX ORDER — ACETAMINOPHEN 500 MG
500 TABLET ORAL 2 TIMES DAILY PRN
Status: DISCONTINUED | OUTPATIENT
Start: 2022-11-26 | End: 2022-11-26

## 2022-11-26 RX ORDER — SODIUM CHLORIDE, SODIUM LACTATE, POTASSIUM CHLORIDE, CALCIUM CHLORIDE 600; 310; 30; 20 MG/100ML; MG/100ML; MG/100ML; MG/100ML
INJECTION, SOLUTION INTRAVENOUS CONTINUOUS
Status: DISCONTINUED | OUTPATIENT
Start: 2022-11-26 | End: 2022-11-26 | Stop reason: HOSPADM

## 2022-11-26 RX ORDER — ACETAMINOPHEN 325 MG/1
650 TABLET ORAL EVERY 6 HOURS PRN
Status: DISCONTINUED | OUTPATIENT
Start: 2022-11-26 | End: 2022-11-26

## 2022-11-26 RX ORDER — HYDROMORPHONE HCL IN WATER/PF 6 MG/30 ML
0.2 PATIENT CONTROLLED ANALGESIA SYRINGE INTRAVENOUS EVERY 5 MIN PRN
Status: DISCONTINUED | OUTPATIENT
Start: 2022-11-26 | End: 2022-11-26 | Stop reason: HOSPADM

## 2022-11-26 RX ORDER — POLYETHYLENE GLYCOL 3350 17 G/17G
17 POWDER, FOR SOLUTION ORAL DAILY
Status: DISCONTINUED | OUTPATIENT
Start: 2022-11-27 | End: 2022-11-28 | Stop reason: HOSPADM

## 2022-11-26 RX ORDER — FENTANYL CITRATE 50 UG/ML
INJECTION, SOLUTION INTRAMUSCULAR; INTRAVENOUS PRN
Status: DISCONTINUED | OUTPATIENT
Start: 2022-11-26 | End: 2022-11-26

## 2022-11-26 RX ORDER — DIPHENHYDRAMINE HCL 25 MG
25 CAPSULE ORAL DAILY PRN
Status: DISCONTINUED | OUTPATIENT
Start: 2022-11-26 | End: 2022-11-28 | Stop reason: HOSPADM

## 2022-11-26 RX ORDER — AMOXICILLIN 250 MG
1 CAPSULE ORAL 2 TIMES DAILY
Status: DISCONTINUED | OUTPATIENT
Start: 2022-11-26 | End: 2022-11-28 | Stop reason: HOSPADM

## 2022-11-26 RX ORDER — OXYCODONE HYDROCHLORIDE 5 MG/1
5 TABLET ORAL EVERY 4 HOURS PRN
Status: DISCONTINUED | OUTPATIENT
Start: 2022-11-26 | End: 2022-11-28 | Stop reason: HOSPADM

## 2022-11-26 RX ORDER — ONDANSETRON 2 MG/ML
INJECTION INTRAMUSCULAR; INTRAVENOUS PRN
Status: DISCONTINUED | OUTPATIENT
Start: 2022-11-26 | End: 2022-11-26

## 2022-11-26 RX ADMIN — ROCURONIUM BROMIDE 10 MG: 50 INJECTION, SOLUTION INTRAVENOUS at 11:19

## 2022-11-26 RX ADMIN — TERAZOSIN HYDROCHLORIDE 5 MG: 5 CAPSULE ORAL at 07:46

## 2022-11-26 RX ADMIN — CEFTRIAXONE SODIUM 1 G: 1 INJECTION, POWDER, FOR SOLUTION INTRAMUSCULAR; INTRAVENOUS at 01:51

## 2022-11-26 RX ADMIN — FENTANYL CITRATE 50 MCG: 50 INJECTION, SOLUTION INTRAMUSCULAR; INTRAVENOUS at 11:01

## 2022-11-26 RX ADMIN — LIDOCAINE HYDROCHLORIDE 100 MG: 20 INJECTION, SOLUTION INFILTRATION; PERINEURAL at 11:01

## 2022-11-26 RX ADMIN — DEXAMETHASONE SODIUM PHOSPHATE 4 MG: 4 INJECTION, SOLUTION INTRA-ARTICULAR; INTRALESIONAL; INTRAMUSCULAR; INTRAVENOUS; SOFT TISSUE at 11:09

## 2022-11-26 RX ADMIN — SODIUM CHLORIDE: 9 INJECTION, SOLUTION INTRAVENOUS at 10:04

## 2022-11-26 RX ADMIN — ONDANSETRON 4 MG: 2 INJECTION INTRAMUSCULAR; INTRAVENOUS at 11:09

## 2022-11-26 RX ADMIN — SODIUM CHLORIDE: 4.5 INJECTION, SOLUTION INTRAVENOUS at 16:05

## 2022-11-26 RX ADMIN — METOPROLOL SUCCINATE 100 MG: 100 TABLET, EXTENDED RELEASE ORAL at 07:46

## 2022-11-26 RX ADMIN — CEFTRIAXONE SODIUM 1 G: 1 INJECTION, POWDER, FOR SOLUTION INTRAMUSCULAR; INTRAVENOUS at 19:55

## 2022-11-26 RX ADMIN — ROCURONIUM BROMIDE 5 MG: 50 INJECTION, SOLUTION INTRAVENOUS at 11:01

## 2022-11-26 RX ADMIN — PROPOFOL 170 MG: 10 INJECTION, EMULSION INTRAVENOUS at 11:01

## 2022-11-26 RX ADMIN — SUCCINYLCHOLINE CHLORIDE 100 MG: 20 INJECTION, SOLUTION INTRAMUSCULAR; INTRAVENOUS; PARENTERAL at 11:01

## 2022-11-26 RX ADMIN — SODIUM CHLORIDE: 9 INJECTION, SOLUTION INTRAVENOUS at 01:47

## 2022-11-26 RX ADMIN — TERAZOSIN HYDROCHLORIDE 5 MG: 5 CAPSULE ORAL at 20:30

## 2022-11-26 RX ADMIN — PHYTONADIONE 5 MG: 10 INJECTION, EMULSION INTRAMUSCULAR; INTRAVENOUS; SUBCUTANEOUS at 04:52

## 2022-11-26 RX ADMIN — SUGAMMADEX 150 MG: 100 INJECTION, SOLUTION INTRAVENOUS at 11:31

## 2022-11-26 RX ADMIN — ATORVASTATIN CALCIUM 40 MG: 40 TABLET, FILM COATED ORAL at 20:30

## 2022-11-26 ASSESSMENT — ACTIVITIES OF DAILY LIVING (ADL)
FALL_HISTORY_WITHIN_LAST_SIX_MONTHS: NO
ADLS_ACUITY_SCORE: 24
ADLS_ACUITY_SCORE: 35
DOING_ERRANDS_INDEPENDENTLY_DIFFICULTY: NO
CHANGE_IN_FUNCTIONAL_STATUS_SINCE_ONSET_OF_CURRENT_ILLNESS/INJURY: YES
ADLS_ACUITY_SCORE: 35
CONCENTRATING,_REMEMBERING_OR_MAKING_DECISIONS_DIFFICULTY: NO
DIFFICULTY_COMMUNICATING: NO
VISION_MANAGEMENT: GLASSES
ADLS_ACUITY_SCORE: 35
TRANSFERRING: 1-->ASSISTANCE (EQUIPMENT/PERSON) NEEDED (NOT DEVELOPMENTALLY APPROPRIATE)
DIFFICULTY_EATING/SWALLOWING: NO
TOILETING_ISSUES: NO
ADLS_ACUITY_SCORE: 24
ADLS_ACUITY_SCORE: 20
ADLS_ACUITY_SCORE: 24
ADLS_ACUITY_SCORE: 22
WEAR_GLASSES_OR_BLIND: YES
WALKING_OR_CLIMBING_STAIRS: STAIR CLIMBING DIFFICULTY, ASSISTANCE 1 PERSON
ADLS_ACUITY_SCORE: 24
ADLS_ACUITY_SCORE: 24
DRESSING/BATHING_DIFFICULTY: NO
WALKING_OR_CLIMBING_STAIRS_DIFFICULTY: YES

## 2022-11-26 ASSESSMENT — ENCOUNTER SYMPTOMS
DYSRHYTHMIAS: 1
SEIZURES: 0

## 2022-11-26 ASSESSMENT — LIFESTYLE VARIABLES: TOBACCO_USE: 0

## 2022-11-26 NOTE — PROGRESS NOTES
UROLOGY BRIEF NOTE  Case reviewed and discussed with Dr. Urias   Reviewed IR consult note from Dr. Stack - taking his comments/recs into consideration, I agree that cystoscopy/stent placement is very reasonable.    PLAN:  Will add on for cysto/stent today -- called OR and first avail is noon   Remain NPO please  INR does not need to be corrected for my purposes, bleeding risk is low  Formal consult note forthcoming, will discuss with patient in preop  Dr. Urias has agreed to notify pt and IR of plan; much appreciated    Ryan Walter MD  [facility]  [phone]

## 2022-11-26 NOTE — BRIEF OP NOTE
[facility]rology Brief Operative Note    Pre-operative diagnosis: Solitary kidney, hydronephrosis, renal failure   Post-operative diagnosis: Same   Procedure: Procedure(s):  CYSTOSCOPY, LEFT  URETERAL STENT PLACEMENT, Left Retrograde Pyleogram   Surgeon: Ryan Walter MD   Assistant(s): None   Anesthesia: General endotracheal anesthesia   Estimated blood loss: None   Total IV fluids: (See anesthesia record)   Blood transfusion: No transfusion was given during surgery   Total urine output: Not measured   Drains: Lan catheter   Specimens: None   Implants: 6 x 24 left ureteral stent   Findings: 1 cm area of stenosis at left UPJ   Complications: None   Condition: Stable   Comments: See dictated operative report for full details

## 2022-11-26 NOTE — PROGRESS NOTES
[facility]    Medicine Progress Note - Hospitalist Service    Date of Admission:  11/25/2022    Assessment & Plan   Simon Martines is a 72 year old male with a past medical history of renal cell carcinoma, coronary artery disease, hypertension, aortic valve replacement, anemia, CKD stage IV presents to hospital with oliguria and renal failure.    Acute renal failure on CKD stage 4  Oliguria  Status post right nephrectomy  Patient is status post right nephrectomy in 2010 for renal cell carcinoma then developed renal cell carcinoma on the left status post ablation at the [facility] in September 2022, and and stent removal on November 9.  Baseline creatinine has been at 2.5 since the ablation.  He presents to hospital due to lack of urine output.  He last urinated approximately 24 hours prior to presentation.  On labs his creatinine is elevated to 8.16.  Not entirely clear why the patient is in acute renal failure.  He does report some chills and there is some fat stranding on CT therefore I am starting the patient on ceftriaxone.  I will also consult nephrology given the severity of his renal dysfunction. The case was discussed with urology in the ed and its suspected that the patient has developed an obstruction and the site of his prior stent and percutaneous drainage was recommended. IR was consulted and plan to place a drain in the morning. Vitamin k 5mg given to reverse his INR.   CT abdomen: Mild left hydronephrosis without obstructive lesion or stone material.  Mild left perinephric soft tissue stranding.  -IV hydration  -Avoid nephrotoxic medication  -Strict I's and O's with Lan catheter, daily weights  -Follow-up nephrology consult, follow-up ir, urology  -Follow-up UA if able to obtain urine,   -Follow-up blood cultures  -Continue terazosin    S/p cystoscopy and left ureteral stenting by urology today, appreciate urology help  Nephrology consult pending for CKD  Fluids and  "antibiotics Rocephin    Coronary artery disease  Hypertension  Status post aortic valve replacement  Paroxysmal atrial fibrillation  -Coumadin to be dosed by pharmacy  -Holding Lasix in setting of renal failure  -Continue metoprolol, atorvastatin    Restart Coumadin today    Lipase elevated  Lipase mildly elevated.  Patient without any abdominal pain.  Likely has decreased clearance of lipase in setting of his acute renal failure.  Given lack of urine output I will not aggressively hydrate him    Anemia  Chronic.  Stable.  -Monitor    Diarrhea  -If he continues to have diarrhea, will obtain stool studies     Diet: Advance Diet as Tolerated: Full Liquid Diet    DVT Prophylaxis: Warfarin  Lan Catheter: PRESENT, indication: /GI/GYN Pelvic Procedure  Central Lines: None  Cardiac Monitoring: None  Code Status: Full Code      Disposition Plan      Expected Discharge Date: 11/27/2022                The patient's care was discussed with the Patient.    Dianna Gale MD  Hospitalist Service  [facility]  Securely message with the Vocera Web Console (learn more here)  Text page via [employer] Paging/Directory         Clinically Significant Risk Factors Present on Admission         # Hyponatremia: Lowest Na = 134 mmol/L (Ref range: 136-145) in last 2 days, will monitor as appropriate      # Hypoalbuminemia: Lowest albumin = 2.7 g/dL (Ref range: 3.5-5.2) at 11/26/2022  6:18 AM, will monitor as appropriate  # Drug Induced Coagulation Defect: home medication list includes an anticoagulant medication   # Acute Kidney Injury, unspecified: based on a >150% or 0.3 mg/dL increase in last creatinine compared to past 90 day average, will monitor renal function  # Hypertension: home medication list includes antihypertensive(s)     # Overweight: Estimated body mass index is 25.04 kg/m  as calculated from the following:    Height as of this encounter: 1.88 m (6' 2\").    Weight as of this encounter: 88.5 kg (195 " lb).           ______________________________________________________________________    Interval History      Patient seen in ER this morning before procedure.  He states he is doing okay and did not have any pain.  Complaining of intermittent watery diarrhea.      Data reviewed today: I reviewed all medications, new labs and imaging results over the last 24 hours. I personally reviewed no images or EKG's today.    Physical Exam   Vital Signs: Temp: 98.4  F (36.9  C) Temp src: Oral BP: (!) 155/82 Pulse: 59   Resp: 18 SpO2: 95 % O2 Device: None (Room air) Oxygen Delivery: 1 LPM  Weight: 195 lbs 0 oz  General Appearance:  no distress  Respiratory: Lungs clear  Cardiovascular: Rate controlled  GI: Nontender  Skin: No rash  Extremities: No edema      Data   Recent Labs   Lab 11/26/22  0905 11/26/22  0618 11/25/22  1423   WBC  --  9.3 8.8   HGB  --  9.0* 9.9*   MCV  --  90 89   PLT  --  159 171   INR  --   --  1.95*   NA  --  136 134   POTASSIUM  --  4.7 4.4   CHLORIDE  --  105 102   CO2  --  20 23   BUN  --  71* 65*   CR  --  9.79* 8.16*   ANIONGAP  --  11 9   GEORGES  --  8.0* 8.3*   * 103* 123*   ALBUMIN  --  2.7* 3.0*   PROTTOTAL  --  6.2* 6.9   BILITOTAL  --  0.7 0.8   ALKPHOS  --  62 74   ALT  --  10 12   AST  --  7 11   LIPASE  --   --  450*     Recent Results (from the past 24 hour(s))   CT Abdomen Pelvis w/o Contrast    Narrative    EXAM: CT ABDOMEN PELVIS W/O CONTRAST  LOCATION: [facility]  DATE/TIME: 11/25/2022 10:10 PM    INDICATION: Left flank pain, h/o R nephrectomy, acute-on-chronic renal insufficiency; CR too high for contrast.  COMPARISON: None.  TECHNIQUE: CT scan of the abdomen and pelvis was performed without IV contrast. Multiplanar reformats were obtained. Dose reduction techniques were used.  CONTRAST: None.    FINDINGS:   LOWER CHEST: Motion artifact degrades several images. Tiny left pleural effusion. No effusion on the right. Elevation of the right hemidiaphragm.  Sternotomy and aortic valve replacement. Normal cardiac size. Dense coronary artery calcifications. No   pericardial effusion.    HEPATOBILIARY: Tiny dependent calcified gallstones without biliary dilatation or adjacent inflammation. No discrete hepatic lesion.    PANCREAS: Normal.    SPLEEN: Mildly enlarged without discrete lesion, AP length measures 14 cm (image 73, series 2).    ADRENAL GLANDS: Normal.    KIDNEYS/BLADDER: Right nephrectomy. Postoperative changes lower aspect of the left kidney. Mild left hydronephrosis without obstructive lesion or stone material. Mild left perinephric soft tissue stranding raises the possibility of ongoing infection or   inflammation. Exophytic hypodense cortical cysts in the upper pole of the left kidney which can be confirmed with targeted ultrasound. Partially distended urinary bladder. Mild prostatomegaly.    BOWEL: Mid sigmoid anastomotic staples. Scattered colonic diverticulosis. No obstruction, free gas or free fluid.    LYMPH NODES: No suspicious abdominopelvic adenopathy.    VASCULATURE: Atherosclerotic and borderline aneurysmal distal abdominal aorta measuring 3.0 x 3.1 cm (image 126, series 2). Atherosclerotic and ectatic proximal common iliac arteries measuring 1.4 cm in diameter on each side (image 172, series 2).   Normal-caliber IVC.    PELVIC ORGANS: Prostatomegaly. Mid sigmoid anastomotic staples. Phleboliths. No adenopathy or free fluid.    MUSCULOSKELETAL: Degenerative spine and joints of the pelvis. Sternotomy.      Impression    IMPRESSION:   1.  Right nephrectomy. Postoperative changes lower aspect of the left kidney. Mild left hydronephrosis without obstructive lesion or stone material. Left perinephric soft tissue stranding raises the possibility of ongoing infection or inflammation.   Hypodense cortical cysts in the upper pole of the left kidney which can be confirmed with targeted ultrasound. Mild prostatomegaly.    2.  Cholelithiasis without biliary  dilatation or adjacent inflammation. Mild splenomegaly. Mid sigmoid anastomotic staples. Scattered colonic diverticulosis without acute inflammation, obstruction, free gas or free fluid.    3.  Elevation of the right hemidiaphragm. Tiny left pleural effusion.    4.  Sternotomy and aortic valve replacement. Normal cardiac size. Dense coronary artery calcifications. No pericardial effusion.    5.  Atherosclerotic and borderline aneurysmal distal abdominal aorta. Atherosclerotic and ectatic proximal common iliac artery. Recommend aortoiliac annual surveillance ultrasound.       Medications     NaCl         acetaminophen  975 mg Oral Q8H     atorvastatin  40 mg Oral At Bedtime     cefTRIAXone  1 g Intravenous QPM     metoprolol succinate ER  100 mg Oral Daily     [START ON 11/27/2022] polyethylene glycol  17 g Oral Daily     senna-docusate  1 tablet Oral BID     sodium chloride (PF)  3 mL Intracatheter Q8H     terazosin  5 mg Oral BID     warfarin ANTICOAGULANT  3-4 mg Oral Daily

## 2022-11-26 NOTE — PHARMACY-ADMISSION MEDICATION HISTORY
Pharmacy Medication History  Admission medication history interview status for the 11/25/2022  admission is complete. See EPIC admission navigator for prior to admission medications     Location of Interview: Patient room  Medication history sources: Patient    Significant changes made to the medication list:    Removed- probiotic, oxycodone   Added - Benefiber     In the past week, patient estimated taking medication this percent of the time: greater than 90%    Additional medication history information:     Patient is a good historian.     Medication reconciliation completed by provider prior to medication history? No    Time spent in this activity: 15 minutes     Prior to Admission medications    Medication Sig Last Dose Taking? Auth Provider Long Term End Date   acetaminophen (TYLENOL) 500 MG tablet Take 500 mg by mouth 2 times daily as needed for mild pain   at PRN Yes Reported, Patient     atorvastatin (LIPITOR) 40 MG tablet Take 40 mg by mouth At Bedtime 11/24/2022 at pm Yes Reported, Patient Yes    furosemide (LASIX) 40 MG tablet Take 40 mg by mouth daily 11/25/2022 at am Yes Reported, Patient Yes    metoprolol succinate ER (TOPROL-XL) 100 MG 24 hr tablet Take 100 mg by mouth daily 11/24/2022 at pm Yes Reported, Patient Yes    terazosin (HYTRIN) 5 MG capsule Take 5 mg by mouth 2 times daily 11/25/2022 at am Yes Reported, Patient Yes    warfarin ANTICOAGULANT (COUMADIN) 1 MG tablet Take 3-4 mg by mouth daily Take:  On Tuesday and Friday 4 X 1mg = 4 mg dose.  On Monday,Wednesday,Thursday, Saturday and Sunday 3 X 1 mg = 3 mg dose. 11/24/2022 at pm Yes Reported, Patient     Wheat Dextrin (BENEFIBER PO) Take 1 packet by mouth 1-2 times a day  Yes Unknown, Entered By History     diphenhydrAMINE (BENADRYL) 25 MG tablet Take 25 mg by mouth daily as needed for allergies or sleep (spring and fall allergies)   at PRN  Reported, Patient         The information provided in this note is only as accurate as the sources  available at the time of update(s)   Reema William, PharmD

## 2022-11-26 NOTE — CONSULTS
Interventional Radiology Consultation      Simon Martines MRN# [id number]   YOB: 1950 Age: 72 year old   Date of Admission: 11/25/2022     Reason for consult: I was asked by ED/Hospitalist to evaluate this patient for Hydronepfrosis.           Assessment and Plan:   Principal Problem:    History of renal carcinoma s/p R nephrectomy, L embo and cryo September.  ARF and hydro    Assessment: S/P Left lower pole embolization and cryoablation w/  hydronephrosis.        Plan: Pt is a candidate for perc nephrostomy tube.  Pt, HOWEVER, at high risk.  On Coumadin-INR will need to be normalized.  Solitary kidney, inc risk for post procedural bleeding causing more permanent renal damage.  Large lower pole ablation and embo-would need to avoid this area.  Given the high risk-await formal Urology recommendations-attempt at JJ may be the safer first step?    If Perc Neph perused INR needs to be corrected.  Post procedural anti coag plan in place (high risk for bleeding with new tube place)      Will cont to follow this AM. Page with question.    Sudeep Stack MD  IR                  Chief Complaint:   Dec urine output.    History is obtained from the patient and electronic health record         History of Present Illness:   This patient is a 72 year old male who presents with postprocedural Left renal hydro and dec UO              Past Medical History:   I have reviewed this patient's past medical history          Past Surgical History:   I have reviewed this patient's past surgical history            Social History:   I have reviewed this patient's social history          Family History:   I have reviewed this patient's family history            Allergies:   All allergies reviewed and addressed          Medications:   I have reviewed this patient's current medications            Data:   All laboratory data reviewed  CREAT: 9.7  INR 1.95

## 2022-11-26 NOTE — CONSULTS
[facility] Inpatient Consultation Note    Simon Martines MRN# [id number]   Age: 72 year old YOB: 1950     Date of Admission:  11/25/2022    Reason for consult: Renal failure, hydronephrosis, solitary kidney       Requesting physician: Dr. Esparza                History of Present Illness:   72 year old former patient of Dr. Tucker who underwent right radical nephrectomy in 2010 for RCC, later found to have 5.5 cm mass in solitary left lower pole kidney and evaluated by Dr. Victorino Romo recently.  He referred him to [facility].  Dr. Marks and radiology team at [facility] performed selective left renal embolization and percutaneous cryoablation of the renal tumor on 9/16/22.  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  At the time, they placed an indwelling left ureteral stent, that was removed at [facility] on 11/9/22.  He did have some lower tract irritative sx from the stent, which improved with tamsulosin, but developed dizziness and stopped this.  Over the past several days he noticed decreased UOP.  He has been essentially anuric now the last 36 hrs.  Creatinine today is 9.79 (baseline 2.50 on 9/27/22).  CT shows hydronephrosis of the left kidney with significant perirenal stranding, normal caliber ureter, no stone. He feels quite lethargic today. Mild left flank pain.    FROM ADMISSION NOTE: Simon Martines is a 72 year old male with a past medical history of renal cell carcinoma, coronary artery disease, hypertension, aortic valve replacement, anemia, CKD stage IV presents to hospital with decreased urine output.  The patient reports that he last urinated on Thursday evening around 7 PM prior to the football game.  That evening he experienced chills and shakes but no fever.  The following morning he had normal urine output and later in the day when he still not had not peed he called the nurse helpline who recommended he come into the hospital.   Patient thinks he may have been drinking less fluids over the last 3 days which she attributes to hosting Thanksgiving and being busy.  He reports 2 episodes of diarrhea in the last 48 hours.  The patient denies any other complaints including fevers, nausea, vomiting, chest pain, shortness of breath, swelling, dysuria, changes in urine color.  Of note the patient has a history of right nephrectomy due to renal cell carcinoma and then was diagnosed with a left kidney mass for which she underwent ablation at the [facility] in September 2022.  Prior to that procedure required a stent which was removed on November 9.  The patient reports that he had been feeling well after the stent removal.          Past Medical History:     Past Medical History:   Diagnosis Date     Anxiety      Arrhythmia     paroxysmal a fib      Ascending aorta dilatation (H)      Cancer (H)      Coronary artery disease      Depressive disorder      Diverticulitis      Hypertension      Renal disease     CKD     Stress fracture of foot              Past Surgical History:     Past Surgical History:   Procedure Laterality Date     APPENDECTOMY       CARDIAC SURGERY      CABG     COMBINED CYSTOSCOPY, INSERT CATHETER URETER Bilateral 2/18/2020    Procedure: CYSTOSCOPY, URETERAL CATHETER INSERTION;  Surgeon: Simon Jesus MD;  Location:  OR     DAVINCI XI ASSISTED RESECTION RECTOSIGMOID N/A 2/18/2020    Procedure: ROBOTIC SIGMOID COLECTOMY, ROBOTIC MOBILIZATION OF SPLENIC FLEXURE;  Surgeon: Ethan Zuleta MD;  Location:  OR     GENITOURINARY SURGERY               Social History:     Social History     Socioeconomic History     Marital status:      Spouse name: Not on file     Number of children: Not on file     Years of education: Not on file     Highest education level: Not on file   Occupational History     Not on file   Tobacco Use     Smoking status: Never     Smokeless tobacco: Never   Substance and Sexual Activity     Alcohol use:  Yes     Drug use: No     Sexual activity: Not on file   Other Topics Concern     Parent/sibling w/ CABG, MI or angioplasty before 65F 55M? Not Asked   Social History Narrative     Not on file     Social Determinants of Health     Financial Resource Strain: Not on file   Food Insecurity: Not on file   Transportation Needs: Not on file   Physical Activity: Not on file   Stress: Not on file   Social Connections: Not on file   Intimate Partner Violence: Not on file   Housing Stability: Not on file             Family History:   No family history on file.          Immunizations:     Immunization History   Administered Date(s) Administered     COVID-19 Vaccine 18+ (Moderna) 02/26/2021, 11/15/2021, 07/25/2022             Allergies:     Allergies   Allergen Reactions     Cats      Dust Mites      Pcn [Penicillins]      Tolerates cefazolin 9/16/22, cefdinir 9/19/22, 10/6/22     Pollen [Pollen Extract]              Medications:     Current Facility-Administered Medications   Medication     [Auto Hold] acetaminophen (TYLENOL) tablet 650 mg    Or     [Auto Hold] acetaminophen (TYLENOL) Suppository 650 mg     [Auto Hold] atorvastatin (LIPITOR) tablet 40 mg     [Auto Hold] cefTRIAXone (ROCEPHIN) 1 g vial to attach to  mL bag for ADULTS or NS 50 mL bag for PEDS     ciprofloxacin (CIPRO) infusion 400 mg     [Auto Hold] lidocaine (LMX4) cream     [Auto Hold] lidocaine 1 % 0.1-1 mL     lidocaine 1 % 0.1-1 mL     [Auto Hold] melatonin tablet 3 mg     [Auto Hold] metoprolol succinate ER (TOPROL XL) 24 hr tablet 100 mg     [Auto Hold] ondansetron (ZOFRAN ODT) ODT tab 4 mg    Or     [Auto Hold] ondansetron (ZOFRAN) injection 4 mg     [Auto Hold] senna-docusate (SENOKOT-S/PERICOLACE) 8.6-50 MG per tablet 1 tablet    Or     [Auto Hold] senna-docusate (SENOKOT-S/PERICOLACE) 8.6-50 MG per tablet 2 tablet     [Auto Hold] sodium chloride (PF) 0.9% PF flush 3 mL     [Auto Hold] sodium chloride (PF) 0.9% PF flush 3 mL     sodium chloride  "0.9% infusion     [Auto Hold] terazosin (HYTRIN) capsule 5 mg             Review of Systems:   Comprehensive review of systems from the Admission note dated 11/25/22 at [facility] was reviewed with no changes except per HPI.     Examination:  BP (!) 164/90   Pulse 55   Temp 98.8  F (37.1  C) (Temporal)   Resp 18   Ht 1.88 m (6' 2\")   Wt 88.5 kg (195 lb)   SpO2 97%   BMI 25.04 kg/m    General: Alert and oriented, no distress  HEENT: Face symmetric, mucous membranes moist and pink  Eyes: No scleral icterus  Neck: Symmetric  Chest wall: Symmetric  Respiratory: Breathing unlabored, no audible wheezing  Cardiac: Extremities warm and well perfused, no edema  Abdomen: soft, non tender, non distended; no rebound, guarding or peritoneal signs  Back: mild left CVA tenderness  Extremities: No evidence of deformities or trauma  Neuro:Grossly non focal  Pysch: Normal mood and affect  Skin: No evident rashes or lesions            Data:     Lab Results   Component Value Date    WBC 9.3 11/26/2022    WBC 8.0 02/21/2020     Lab Results   Component Value Date    RBC 3.17 11/26/2022    RBC 3.51 02/21/2020     Lab Results   Component Value Date    HGB 9.0 11/26/2022    HGB 10.2 02/21/2020     Lab Results   Component Value Date    HCT 28.6 11/26/2022    HCT 31.5 02/21/2020     Lab Results   Component Value Date     11/26/2022     02/21/2020     Creatinine   Date Value Ref Range Status   11/26/2022 9.79 (H) 0.66 - 1.25 mg/dL Final   02/21/2020 1.31 (H) 0.66 - 1.25 mg/dL Final   ]  Lab Results   Component Value Date    BUN 71 11/26/2022    BUN 20 02/21/2020       Imaging:  EXAM: CT ABDOMEN PELVIS W/O CONTRAST  LOCATION: [facility]  DATE/TIME: 11/25/2022 10:10 PM     INDICATION: Left flank pain, h/o R nephrectomy, acute-on-chronic renal insufficiency; CR too high for contrast.  COMPARISON: None.  TECHNIQUE: CT scan of the abdomen and pelvis was performed without IV contrast. " Multiplanar reformats were obtained. Dose reduction techniques were used.  CONTRAST: None.     FINDINGS:   LOWER CHEST: Motion artifact degrades several images. Tiny left pleural effusion. No effusion on the right. Elevation of the right hemidiaphragm. Sternotomy and aortic valve replacement. Normal cardiac size. Dense coronary artery calcifications. No   pericardial effusion.     HEPATOBILIARY: Tiny dependent calcified gallstones without biliary dilatation or adjacent inflammation. No discrete hepatic lesion.     PANCREAS: Normal.     SPLEEN: Mildly enlarged without discrete lesion, AP length measures 14 cm (image 73, series 2).     ADRENAL GLANDS: Normal.     KIDNEYS/BLADDER: Right nephrectomy. Postoperative changes lower aspect of the left kidney. Mild left hydronephrosis without obstructive lesion or stone material. Mild left perinephric soft tissue stranding raises the possibility of ongoing infection or   inflammation. Exophytic hypodense cortical cysts in the upper pole of the left kidney which can be confirmed with targeted ultrasound. Partially distended urinary bladder. Mild prostatomegaly.     BOWEL: Mid sigmoid anastomotic staples. Scattered colonic diverticulosis. No obstruction, free gas or free fluid.     LYMPH NODES: No suspicious abdominopelvic adenopathy.     VASCULATURE: Atherosclerotic and borderline aneurysmal distal abdominal aorta measuring 3.0 x 3.1 cm (image 126, series 2). Atherosclerotic and ectatic proximal common iliac arteries measuring 1.4 cm in diameter on each side (image 172, series 2).   Normal-caliber IVC.     PELVIC ORGANS: Prostatomegaly. Mid sigmoid anastomotic staples. Phleboliths. No adenopathy or free fluid.     MUSCULOSKELETAL: Degenerative spine and joints of the pelvis. Sternotomy.                                                                      IMPRESSION:   1.  Right nephrectomy. Postoperative changes lower aspect of the left kidney. Mild left hydronephrosis without  obstructive lesion or stone material. Left perinephric soft tissue stranding raises the possibility of ongoing infection or inflammation.   Hypodense cortical cysts in the upper pole of the left kidney which can be confirmed with targeted ultrasound. Mild prostatomegaly.     2.  Cholelithiasis without biliary dilatation or adjacent inflammation. Mild splenomegaly. Mid sigmoid anastomotic staples. Scattered colonic diverticulosis without acute inflammation, obstruction, free gas or free fluid.     3.  Elevation of the right hemidiaphragm. Tiny left pleural effusion.     4.  Sternotomy and aortic valve replacement. Normal cardiac size. Dense coronary artery calcifications. No pericardial effusion.     5.  Atherosclerotic and borderline aneurysmal distal abdominal aorta. Atherosclerotic and ectatic proximal common iliac artery. Recommend aortoiliac annual surveillance ultrasound.    Impression:  -Solitary left kidney  -Hx bilateral RCC, s/p right nephrectomy 2010  -S/p left renal angioembolization and cryoablation of 5.5 cm renal mass at [facility] 9/16/22  -Left proximal ureteral obstruction causing acute renal failure, likely from postprocedural scarring/stricture    Plan:  I discussed with Mr. Martines his options of nephrostomy tube placement vs. ureteral stent placement.  We discussed that he may have a high grade or totally obliterative post-procedural UPJ stricture, in which case I may not be able to place a stent today.  However, a stent would be less invasive with less bleeding risk, less risk of urinoma/urine leak from partially devascularized kidney, less trauma to solitary kidney than PCNT placement.  He may require a long term stent with exchanges every several months; we discussed that I would defer long term management to his primary urologists at [facility].  Discussed risks of bleeding, infection, bladder irritation/pain from stent (for which he is aware from his previous stent), and possible inability to place stent  or stent failure if there is too much scarring/stenosis.  All of his questions were answered.  He elects to proceed with cystoscopy and stent placement today.      Ryan Walter MD  [facility] ([facility])  [phone]

## 2022-11-26 NOTE — ED PROVIDER NOTES
Received phone call from Dr. Walter, Urology.     He reviewed patient's chart and imaging and is planning on OR today at around noon to replace left ureteral stent. He asked that I contact IR to let them know to hold their plans regarding nephrostomy tube, and I did call and speak with Dr. Stack to update him as to urology's plans. I also updated patient and let him know that Urology is planning on OR stent placement at around noon today and Dr. Walter would meet him in pre-op.      Jong Urias MD  11/26/22 08

## 2022-11-26 NOTE — ED PROVIDER NOTES
History   Chief Complaint:  Urinary Retention       HPI   Simon Martines is a 72 year old male with history of CKD who presents with urinary retension.  He has a very complicated past medical history of a right-sided renal cell carcinoma in 2010, status post nephrectomy.  He then suffered a renal cell carcinoma in the left kidney, discovered this summer.  He underwent a cryoablation procedure approximately 6 weeks ago at [facility] and had a ureteral stent placed at that time.  He tolerated this procedure well and had the stent removed 17 days ago.  Since then, he has been making urine normally.  He has had minimal discomfort, though he has found these had some left-sided aching which he was not terribly worried about.  He remembers urinating last night at approximately 7 PM.  However, since then, he has had no urine output.  He describes having 1 spell of chills in the middle of the night.  He describes having 1 episode of diarrhea this morning.  However, when he had no urine output this morning and into the later morning hours, he spoke with the [facility] who recommended that he come to the emergency department for a catheter placement for probable urinary retention.    Unfortunately, there was an extensive wait to be seen today.  However, a physician in triage saw him and ordered labs to be drawn.  I also performed a bladder scan, showing less than 100 cc in his bladder.  Therefore, catheter was not placed.  He continue to wait to be seen and eventually was brought back into the main room when I saw him at approximately 9:45 PM.      Review of Systems   Genitourinary: Positive for decreased urine volume.   All other systems reviewed and are negative.      Allergies:  Cats  Dust Mites  Pcn [Penicillins]  Pollen [Pollen Extract]    Medications:  Tylenol  Lipitor  Lasix  Toprol Xl  Hytrin  Probiotic  Coumadin  Benadryl  Omnicef    Past Medical History:     Diverticulitis  Failure renal acute  Atrial  "fibrillation paroxysmal  Ectasia thoracic aortic  Atherosclerotic heart disease of native coronary artery with angina pectoris  CKD  Nonrheumatic aortic valve insufficiency  Hypertension essential  ASHD  Hyperuricemia  PAF  Leukocytosis  Ascending aorta dilatation     Past Surgical History:    Appendectomy  Cardiac surgery  Cystoscopy  DaVinci Xi assisted resection rectosigmoid  Genitourinary surgery  Cystoscopy  Colonoscopy  Nephrectomy- right  4th finger vascular repair  AVR  Robotic-assisted sigmoid colectomy     Family History:    Heart disease - brother  Cancer- prostate - father  Diabetes - mother    Social History:  The patient presents to the ED with his wife.  PCP: Henrry Scott       Physical Exam     Patient Vitals for the past 24 hrs:   BP Temp Temp src Pulse Resp SpO2 Height Weight   11/25/22 1411 119/69 98.4  F (36.9  C) Temporal 63 18 98 % 1.88 m (6' 2\") 88.5 kg (195 lb)       Physical Exam  Eye:  Pupils are equal, round, and reactive.  Extraocular movements intact.    ENT:  No rhinorrhea.  Moist mucus membranes.  Normal tongue and tonsil.    Cardiac:  Regular rate and rhythm.  No murmurs, gallops, or rubs.    Pulmonary:  Clear to auscultation bilaterally.  No wheezes, rales, or rhonchi.    Abdomen:  Positive bowel sounds.  Abdomen is soft and non-distended, without focal tenderness.    : Circumcised male.  No blood at the meatus.    Musculoskeletal:  Normal movement of all extremities without evidence for deficit.    Skin:  Warm and dry without rashes.    Neurologic:  Non-focal exam without asymmetric weakness or numbness.     Psychiatric:  Normal affect with appropriate interaction with examiner.      Emergency Department Course   Imaging:  CT Abdomen Pelvis w/o Contrast   Final Result   IMPRESSION:    1.  Right nephrectomy. Postoperative changes lower aspect of the left kidney. Mild left hydronephrosis without obstructive lesion or stone material. Left perinephric soft tissue stranding " raises the possibility of ongoing infection or inflammation.    Hypodense cortical cysts in the upper pole of the left kidney which can be confirmed with targeted ultrasound. Mild prostatomegaly.      2.  Cholelithiasis without biliary dilatation or adjacent inflammation. Mild splenomegaly. Mid sigmoid anastomotic staples. Scattered colonic diverticulosis without acute inflammation, obstruction, free gas or free fluid.      3.  Elevation of the right hemidiaphragm. Tiny left pleural effusion.      4.  Sternotomy and aortic valve replacement. Normal cardiac size. Dense coronary artery calcifications. No pericardial effusion.      5.  Atherosclerotic and borderline aneurysmal distal abdominal aorta. Atherosclerotic and ectatic proximal common iliac artery. Recommend aortoiliac annual surveillance ultrasound.           Report per radiology    Laboratory:  Labs Ordered and Resulted from Time of ED Arrival to Time of ED Departure   COMPREHENSIVE METABOLIC PANEL - Abnormal       Result Value    Sodium 134      Potassium 4.4      Chloride 102      Carbon Dioxide (CO2) 23      Anion Gap 9      Urea Nitrogen 65 (*)     Creatinine 8.16 (*)     Calcium 8.3 (*)     Glucose 123 (*)     Alkaline Phosphatase 74      AST 11      ALT 12      Protein Total 6.9      Albumin 3.0 (*)     Bilirubin Total 0.8      GFR Estimate 6 (*)    LIPASE - Abnormal    Lipase 450 (*)    CBC WITH PLATELETS AND DIFFERENTIAL - Abnormal    WBC Count 8.8      RBC Count 3.43 (*)     Hemoglobin 9.9 (*)     Hematocrit 30.5 (*)     MCV 89      MCH 28.9      MCHC 32.5      RDW 14.7      Platelet Count 171      % Neutrophils 75      % Lymphocytes 13      % Monocytes 9      % Eosinophils 2      % Basophils 1      % Immature Granulocytes 0      NRBCs per 100 WBC 0      Absolute Neutrophils 6.7      Absolute Lymphocytes 1.2      Absolute Monocytes 0.8      Absolute Eosinophils 0.1      Absolute Basophils 0.0      Absolute Immature Granulocytes 0.0      Absolute  NRBCs 0.0     INR - Abnormal    INR 1.95 (*)    ROUTINE UA WITH MICROSCOPIC   BLOOD CULTURE   BLOOD CULTURE        Procedures  I performed a bedside ultrasound which shows a decompressed bladder with a Lan catheter in the appropriate place.    Emergency Department Course:     Reviewed:  I reviewed nursing notes, vitals, past medical history and Care Everywhere    Assessments:  2148 I obtained history and examined the patient as noted above.       Consults:  I briefly spoke with Dr. Lam of [facility] who notified me that the patient is a client of [facility].  I briefly spoke with Dr. Lia Lopez of [facility] who states that she does not cover [facility].  I spoke twice with  of [facility] regarding this case.  I spoke with Dr. Chun of interventional radiology    Interventions:  1L NS  2g rocephin      Disposition:  The patient was admitted to the hospital under the care of Dr. Esparza.     Impression & Plan     Medical Decision Making:  This delightful 72-year-old man with a solitary kidney status post recent cryoablation of part of the kidney presents to us because of lack of urine output along with signs of acute renal failure.  His baseline creatinine is 2.5, now found to be elevated at greater than 8.  Most concerning, he has had no urine output.  Interestingly, his vital signs are reassuring.  He has no significant pain in his flank and no pain on his abdominal exam.  CT of the abdomen was obtained which shows mild hydronephrosis with some stranding to the inferior aspect of the kidney.  The patient notes that he has been eating and drinking normally and I do not believe that this would represent a prerenal phenomenon.  It seems unlikely that he would suddenly go into some form of acute tubular necrosis which would cause sudden anuria.  With concerns of this representing more of a postobstructive picture, I reached out to our urologist.  Required a few calls  to reach the appropriate urologist, though eventually I was able to review the case with Dr. Galeano.  We discussed and reviewed the CT.  He believes that it is most likely that the patient has developed a progressive stricture phenomenon in the proximal ureter or that he may be dealing with more of an obstruction in the ureteropelvic junction.  Nonetheless, he believes that this is likely been progressive over several days, possibly even since the stent was removed.  Because this is most likely an issue with the ureter, he does not recommend proceeding with stent placement at this time.  Instead, he feels that coming from above with a percutaneous nephrostomy tube would be the most appropriate plan moving forward.  While this needs to be performed urgently, he did not feel that absolute emergent placement was necessary as the patient is otherwise without a fever, with a normal white blood cell count, and is very comfortable on exam.  Therefore, I reached out to Dr. Chun of interventional radiology.  We discussed the case, understanding that this needs to be performed urgently, though there is no need to have it performed at 3 in the morning.  The patient will be one of the very first cases of the day this morning and it was requested that he be n.p.o. and to have his INR reversed with vitamin K.    The only other issue is regarding a catheter placement.  The catheter went in without difficulty per nursing.  However, the patient had absolutely no urine output.  I performed a bedside ultrasound which appeared to show a catheter to be in the appropriate place, though there was very little urine seen on the CT.  I instructed nursing to flush his catheter with 100 cc of sterile saline.  While the saline went without difficulty, we were unable to pull back on it and the patient appeared to have some bladder spasms.  Eventually, he would push the saline around the catheter.  When I spoke with Dr. Galeano about this, he  believes that the bladder is likely spasming down around the catheter, not allowing it to be draining appropriately.  Considering that the patient is not making urine and he is uncomfortable with catheter in place, I directed nursing to remove the catheter at this time.    I went over all of these issues with the patient.  I spoke with Dr. Esparza with the hospital service who will admit the patient.  Clearly this patient is in a significant medical emergency considering his solitary kidney which is now in failure with signs of an obstructive picture.  However, critical actions of been reached through the emergency department and we will proceed with his PERC nephrostomy in the next 46 hours.  Critical care time: 90 minutes exclusive of all procedures.      Diagnosis:    ICD-10-CM    1. Acute renal failure superimposed on stage 3b chronic kidney disease, unspecified acute renal failure type (H)  N17.9     N18.32       2. S/p nephrectomy  Z90.5       3. Postoperative state  Z98.890       4. History of renal carcinoma  Z85.528           Scribe Disclosure:  I, AVE AYOUB, am serving as a scribe at 9:48 PM on 11/25/2022 to document services personally performed by Trierweiler, Chad A, MD, based on my observations and the provider's statements to me.              Trierweiler, Chad A, MD  11/26/22 5209

## 2022-11-26 NOTE — H&P
[facility]    History and Physical  Hospitalist     Date of Admission:  11/25/2022    Assessment & Plan   Simon Martines is a 72 year old male with a past medical history of renal cell carcinoma, coronary artery disease, hypertension, aortic valve replacement, anemia, CKD stage IV presents to hospital with oliguria and renal failure.    Acute renal failure on CKD stage 4  Oliguria  Status post right nephrectomy  Patient is status post right nephrectomy in 2010 for renal cell carcinoma then developed renal cell carcinoma on the left status post ablation at the [facility] in September 2022, and and stent removal on November 9.  Baseline creatinine has been at 2.5 since the ablation.  He presents to hospital due to lack of urine output.  He last urinated approximately 24 hours prior to presentation.  On labs his creatinine is elevated to 8.16.  Not entirely clear why the patient is in acute renal failure.  He does report some chills and there is some fat stranding on CT therefore I am starting the patient on ceftriaxone.  I will also consult nephrology given the severity of his renal dysfunction. The case was discussed with urology in the ed and its suspected that the patient has developed an obstruction and the site of his prior stent and percutaneous drainage was recommended. IR was consulted and plan to place a drain in the morning. Vitamin k 5mg given to reverse his INR.   CT abdomen: Mild left hydronephrosis without obstructive lesion or stone material.  Mild left perinephric soft tissue stranding.  -IV hydration  -Avoid nephrotoxic medication  -Strict I's and O's with Lan catheter, daily weights  -Follow-up nephrology consult, follow-up ir, urology  -Follow-up UA if able to obtain urine,   -Follow-up blood cultures  -Continue terazosin    Coronary artery disease  Hypertension  Status post aortic valve replacement  Paroxysmal atrial fibrillation  -Coumadin to be dosed by  "pharmacy  -Holding Lasix in setting of renal failure  -Continue metoprolol, atorvastatin    Lipase elevated  Lipase mildly elevated.  Patient without any abdominal pain.  Likely has decreased clearance of lipase in setting of his acute renal failure.  Given lack of urine output I will not aggressively hydrate him    Anemia  Chronic.  Stable.  -Monitor    Code Status   Full Code  DVT ppx: SCDs  Expected length of stay greater than 2 days    Clinically Significant Risk Factors Present on Admission         # Hyponatremia: Lowest Na = 134 mmol/L (Ref range: 136-145) in last 2 days, will monitor as appropriate      # Hypoalbuminemia: Lowest albumin = 3 g/dL (Ref range: 3.5-5.2) at 11/25/2022  2:23 PM, will monitor as appropriate  # Drug Induced Coagulation Defect: home medication list includes an anticoagulant medication    # Hypertension: home medication list includes antihypertensive(s)     # Overweight: Estimated body mass index is 25.04 kg/m  as calculated from the following:    Height as of this encounter: 1.88 m (6' 2\").    Weight as of this encounter: 88.5 kg (195 lb).           Primary Care Physician   Henrry Scott    Chief Complaint   Urinary Retention    History obtained from the patient    History of Present Illness   Simon Martines is a 72 year old male with a past medical history of renal cell carcinoma, coronary artery disease, hypertension, aortic valve replacement, anemia, CKD stage IV presents to hospital with decreased urine output.  The patient reports that he last urinated on Thursday evening around 7 PM prior to the football game.  That evening he experienced chills and shakes but no fever.  The following morning he had normal urine output and later in the day when he still not had not peed he called the nurse helpline who recommended he come into the hospital.  Patient thinks he may have been drinking less fluids over the last 3 days which she attributes to hosting Thanksgiving and being " busy.  He reports 2 episodes of diarrhea in the last 48 hours.  The patient denies any other complaints including fevers, nausea, vomiting, chest pain, shortness of breath, swelling, dysuria, changes in urine color.  Of note the patient has a history of right nephrectomy due to renal cell carcinoma and then was diagnosed with a left kidney mass for which she underwent ablation at the [facility] in September 2022.  Prior to that procedure required a stent which was removed on November 9.  The patient reports that he had been feeling well after the stent removal.    Past Medical History    I have reviewed this patient's medical history and updated it with pertinent information if needed.   Past Medical History:   Diagnosis Date     Anxiety      Arrhythmia     paroxysmal a fib      Ascending aorta dilatation (H)      Cancer (H)      Coronary artery disease      Depressive disorder      Diverticulitis      Hypertension      Renal disease     CKD     Stress fracture of foot        Past Surgical History   I have reviewed this patient's surgical history and updated it with pertinent information if needed.  Past Surgical History:   Procedure Laterality Date     APPENDECTOMY       CARDIAC SURGERY      CABG     COMBINED CYSTOSCOPY, INSERT CATHETER URETER Bilateral 2/18/2020    Procedure: CYSTOSCOPY, URETERAL CATHETER INSERTION;  Surgeon: Simon Jesus MD;  [facility]  [facility]/A 2/18/2020    Procedure: ROBOTIC SIGMOID COLECTOMY, ROBOTIC MOBILIZATION OF SPLENIC FLEXURE;  Surgeon: Ethan Zuleta MD;  Location:  OR     GENITOURINARY SURGERY         Allergies   Allergies   Allergen Reactions     Cats      Dust Mites      Pcn [Penicillins]      Tolerates cefazolin 9/16/22, cefdinir 9/19/22, 10/6/22     Pollen [Pollen Extract]        Social History   I have reviewed this patient's social history and updated it with pertinent information if needed. Simon Martines  reports that  he has never smoked. He has never used smokeless tobacco. He reports current alcohol use. He reports that he does not use drugs.    Family History   I have reviewed this patient's family history and updated it with pertinent information if needed.   He denies any history of kidney disease in the family.    Review of Systems   The 10 point Review of Systems is negative other than noted in the HPI or here.     Physical Exam   Temp: 98.4  F (36.9  C) Temp src: Temporal BP: 119/69 Pulse: 63   Resp: 18 SpO2: 98 % O2 Device: None (Room air)    Vital Signs with Ranges  Temp:  [98.4  F (36.9  C)] 98.4  F (36.9  C)  Pulse:  [63] 63  Resp:  [18] 18  BP: (119)/(69) 119/69  SpO2:  [98 %] 98 %  195 lbs 0 oz  Physical Exam  Vitals reviewed.   Constitutional:       Appearance: Normal appearance.      Comments: Pleasant elderly man seen resting in bed comfortably no apparent distress in emergency room accompanied by his wife who is bedside.  Patient is well-developed and well-nourished.   HENT:      Head: Normocephalic and atraumatic.      Mouth/Throat:      Mouth: Mucous membranes are moist.      Pharynx: Oropharynx is clear.   Eyes:      Extraocular Movements: Extraocular movements intact.      Conjunctiva/sclera: Conjunctivae normal.      Pupils: Pupils are equal, round, and reactive to light.   Cardiovascular:      Rate and Rhythm: Normal rate and regular rhythm.      Pulses: Normal pulses.      Heart sounds: Normal heart sounds. No murmur heard.  Pulmonary:      Effort: Pulmonary effort is normal.      Breath sounds: Normal breath sounds. No wheezing, rhonchi or rales.   Abdominal:      General: Abdomen is flat. Bowel sounds are normal.      Palpations: Abdomen is soft. There is no mass.      Tenderness: There is no abdominal tenderness. There is no guarding.   Genitourinary:     Comments: Lan catheter in place with no urine output.  Musculoskeletal:         General: No swelling. Normal range of motion.      Cervical back:  Normal range of motion and neck supple.   Skin:     General: Skin is warm and dry.   Neurological:      General: No focal deficit present.      Mental Status: He is alert and oriented to person, place, and time. Mental status is at baseline.      Cranial Nerves: No cranial nerve deficit.

## 2022-11-26 NOTE — CONSULTS
RENAL CONSULTATION NOTE    REFERRING MD:  Marsha Esparza MD    REASON FOR CONSULTATION:  Renal failure    HPI:  72 y.o man with CKD IV, RCC s/p total right nephrectomy in 2010, CAD and HTN, who was admitted for severe acute kidney injury.     He was recently found to have a 5.5 cm left renal mass.  He had elective renal embolization and percutaneous cryoablation in mid September.   He says his ureteral stent was removed about two weeks ago.   He noticed he had not urinated for 24-hrs on Thursday.   CT shows hydronephrosis of the left kidney with significant perirenal stranding, normal caliber ureter, no stone.  Under [facility] did a cysto and left ureteral stent placement this morning.   Currently, patient complains of urine leakage around the montgomery catheter.  Urology was paged.   No other complaints from patient.     ROS:  A complete review of systems was performed and is negative except as noted above.    PMH:    Past Medical History:   Diagnosis Date     Anxiety      Arrhythmia     paroxysmal a fib      Ascending aorta dilatation (H)      Cancer (H)      Coronary artery disease      Depressive disorder      Diverticulitis      Hypertension      Renal disease     CKD     Stress fracture of foot        PSH:    Past Surgical History:   Procedure Laterality Date     APPENDECTOMY       CARDIAC SURGERY      CABG     COMBINED CYSTOSCOPY, INSERT CATHETER URETER Bilateral 2/18/2020    Procedure: CYSTOSCOPY, URETERAL CATHETER INSERTION;  Surgeon: Simon Jesus MD;  Location:  OR     DAVINCI XI ASSISTED RESECTION RECTOSIGMOID N/A 2/18/2020    Procedure: ROBOTIC SIGMOID COLECTOMY, ROBOTIC MOBILIZATION OF SPLENIC FLEXURE;  Surgeon: Ethan Zuleta MD;  Location:  OR     GENITOURINARY SURGERY         MEDICATIONS:      [Auto Hold] atorvastatin  40 mg Oral At Bedtime     [Auto Hold] cefTRIAXone  1 g Intravenous QPM     [Auto Hold] metoprolol succinate ER  100 mg Oral Daily     [Auto Hold] sodium chloride  "(PF)  3 mL Intracatheter Q8H     [Auto Hold] terazosin  5 mg Oral BID       ALLERGIES:    Allergies as of 11/25/2022 - Reviewed 11/25/2022   Allergen Reaction Noted     Cats  03/12/2008     Dust mites  03/12/2008     Pcn [penicillins]  03/12/2008     Pollen [pollen extract]  03/12/2008       FH:  No family history on file.    SH:    Social History     Socioeconomic History     Marital status:      Spouse name: Not on file     Number of children: Not on file     Years of education: Not on file     Highest education level: Not on file   Occupational History     Not on file   Tobacco Use     Smoking status: Never     Smokeless tobacco: Never   Substance and Sexual Activity     Alcohol use: Yes     Drug use: No     Sexual activity: Not on file   Other Topics Concern     Parent/sibling w/ CABG, MI or angioplasty before 65F 55M? Not Asked   Social History Narrative     Not on file     Social Determinants of Health     Financial Resource Strain: Not on file   Food Insecurity: Not on file   Transportation Needs: Not on file   Physical Activity: Not on file   Stress: Not on file   Social Connections: Not on file   Intimate Partner Violence: Not on file   Housing Stability: Not on file       PHYSICAL EXAM:    BP (!) 140/79   Pulse 63   Temp 99  F (37.2  C)   Resp 13   Ht 1.88 m (6' 2\")   Wt 88.5 kg (195 lb)   SpO2 94%   BMI 25.04 kg/m    GENERAL: pleasant, alert, NAD  HEENT:  Normocephalic. No gross abnormalities.  Pupils equal.  MMM.  Dentition is ok.  CV: RRR, +murmurs, no clicks, gallops, or rubs, no edema  RESP: Clear bilaterally with good efforts  GI: Abdomen Soft, NT. ND  MUSCULOSKELETAL: extremities nl - no gross deformities noted. No edema.   SKIN: no suspicious lesions or rashes, dry to touch  NEURO:  Strength normal and symmetric. Awake, alert and conversing normally  PSYCH: mood good, affect appropriate  LYMPH: No palpable ant/post cervical and supraclavicular adenopathy  Lan catheter is draining " clear urine.     LABS:      CBC RESULTS:     Recent Labs   Lab 11/26/22  0618 11/25/22  1423   WBC 9.3 8.8   RBC 3.17* 3.43*   HGB 9.0* 9.9*   HCT 28.6* 30.5*    171       BMP RESULTS:  Recent Labs   Lab 11/26/22  0905 11/26/22  0618 11/25/22  1423   NA  --  136 134   POTASSIUM  --  4.7 4.4   CHLORIDE  --  105 102   CO2  --  20 23   BUN  --  71* 65*   CR  --  9.79* 8.16*   * 103* 123*   GEORGES  --  8.0* 8.3*       INR  Recent Labs   Lab 11/25/22  1423   INR 1.95*        DIAGNOSTICS:  Reviewed    A/P:  72 y.o man with CKD IV and RCC, admitted for severe WES from obstructive uropathy.     # CKD IV: Baseline ~ 2.5 mg/dl and eGFR in the 20s ml/min He had a SCr of 2.58 mg/dl and an eGFR of 26 ml/mon on 10/21/2022.    -follows with Dr. Jackson.     # RCC s/p radical right nephrectomy in 2010.     # 5.5 cm left renal mass s/p elective renal artery embolization and cryoablation in mid September.     # Mild metabolic acidosis from severe renal failure.     # Anemia: Hgb is not bad.     #  HTN: BP is acceptable. Toprol  mg daily    Plan:  # start 1/2 NS at 100 ml/hr  # Renal panel in AM  # No indication for RRT.     Camron Bauman MD  [facility]phrology  Office Phone: [phone]  Pager: [phone]

## 2022-11-26 NOTE — OP NOTE
Procedure Date: 11/26/2022    SURGEON:  Ryan Walter MD    PREOPERATIVE DIAGNOSES:    1.  Solitary left kidney.  2.  Left hydronephrosis.  3.  Acute renal failure.    POSTOPERATIVE DIAGNOSIS:    1.  Solitary left kidney.  2.  Left hydronephrosis.  3.  Acute renal failure.    OPERATION:  Cystoscopy, left retrograde pyelogram, left ureteral stent placement.    FINDINGS:  A 1 cm segment of stenosis at the left ureteropelvic junction.    COMPLICATIONS:  None.    BLOOD LOSS:  None.    ANESTHESIA:  General.    INDICATIONS FOR PROCEDURE:  Simon Martines is a 72-year-old gentleman who had a right radical nephrectomy in 2010 by Dr. Tucker.  More recently, he was diagnosed with a 5.5 cm lower pole mass in the left kidney.  He saw Dr. Victorino Romo, who then referred to the [facility].  They performed selective angioembolization and cryoablation of the renal mass on 09/16/2022.  He had an indwelling left ureteral stent, which was removed at [facility] approximately 2 weeks ago.  Over the past few days, he has developed decreasing urine output and is now anuric with acute renal failure.  CT scan shows left-sided hydronephrosis with a normal caliber ureter and no stones.  There is moderate amount of perinephric edema and stranding.  I saw him in consultation.  We discussed options of a percutaneous nephrostomy tube versus placement of a ureteral stent, including the risks and the pros and cons of either option, and he elects to proceed now with left ureteral stent placement.    DESCRIPTION OF PROCEDURE:  After informed consent was obtained, the patient was taken to urology operative suite.  He was positioned supine and general anesthesia was induced.  He was converted to lithotomy and was prepped and draped in standard fashion.  Timeout was taken.  He was given preoperative IV antibiotics.  A 22-Kiswahili cystoscope was placed into the bladder.  He had normal urethra.  The prostate had moderate lateral lobe enlargement.  The bladder had mild  trabeculation.  There were no tumors, foreign bodies or stones in the bladder.  There was a small clot noted at the bladder base.  The left ureteral orifice was identified.  An open-ended ureteral catheter was placed.  Retrograde pyelogram was performed.  The mid and distal ureter were normal.  At the proximal ureter, there was about a 1-2 cm segment were it had an hourglass type appearance, where there was significant stenosis at the area of the ureteropelvic junction.  The renal collecting system did fill with contrast and had mild hydronephrosis noted.  A straight tipped sensor wire was placed up the left ureter and I was able to navigate this up into the left renal collecting system.  The wire was then used to place a 6-Malagasy x 24 cm [employer] double-J ureteral stent in the standard fashion.  The wire was removed and the stent had nice curl on both ends and was in good position.  It appeared to be draining well with no purulent drainage.  The scope was removed.  A temporary 18-Malagasy coude tip Lan catheter was placed.  He was awoken and taken to recovery in stable condition.  There were no complications.    PLAN:  He will be readmitted to the medicine service for monitoring of his renal function and postobstructive diuresis.  Lan catheter can be removed in the next 1-2 days once his renal function is improving.  For long-term management, we will defer to [facility] as he appears to have developed a postoperative stricture after his procedure done at their facility.  I have discussed with him prior to surgery that he may need to be managed with a long-term indwelling stent.  We discussed these do have to be changed every several months.    Ryan Walter MD        D: 2022   T: 2022   MT: ALE    Name:     PHILIP HOWARD  MRN:      [id number]        Account:        [id number]   :      1950           Procedure Date: 2022     Document: [id number]    cc:   MD Ryan Ventura MD   [facility]

## 2022-11-26 NOTE — PROVIDER NOTIFICATION
MD Notification    Notified Person: MD    Notified Person Name: Jose Angel    Notification Date/Time: 11/26/22, 2:04 PM    Notification Interaction: Phone page    Purpose of Notification: Pt is leaking moderate amount of urine around montgomery catheter, please advise    Orders Received:    Comments: MD believes that pt will be producing large amount of urine after surgery, can hand irrigate if needed, verbal orders for PRN hand irrigation

## 2022-11-26 NOTE — ANESTHESIA CARE TRANSFER NOTE
Patient: Simon Martines    Procedure: Procedure(s):  CYSTOSCOPY, LEFT  URETERAL STENT PLACEMENT, Left Retrograde Pyleogram       Diagnosis: Ureteral stricture, left [N13.5]  Diagnosis Additional Information: No value filed.    Anesthesia Type:   General     Note:    Oropharynx: oropharynx clear of all foreign objects and spontaneously breathing  Level of Consciousness: awake  Oxygen Supplementation: face mask  Level of Supplemental Oxygen (L/min / FiO2): 10  Independent Airway: airway patency satisfactory and stable  Dentition: dentition unchanged  Vital Signs Stable: post-procedure vital signs reviewed and stable  Report to RN Given: handoff report given  Patient transferred to: PACU  Comments: Pt awake and able to verbalize needs. ALL monitors on and audible. Spontaneous respiration without difficulty. o2 sats 98%. Pt denies pain. Report given to PACU RN.  Handoff Report: Identifed the Patient, Identified the Reponsible Provider, Reviewed the pertinent medical history, Discussed the surgical course, Reviewed Intra-OP anesthesia mangement and issues during anesthesia, Set expectations for post-procedure period and Allowed opportunity for questions and acknowledgement of understanding      Vitals:  Vitals Value Taken Time   BP     Temp     Pulse 70 11/26/22 1147   Resp 15 11/26/22 1147   SpO2 99 % 11/26/22 1147   Vitals shown include unvalidated device data.    Electronically Signed By: ELIZABETH You CRNA  November 26, 2022  11:48 AM

## 2022-11-26 NOTE — ED NOTES
Patient verbalized extreme anxiety regarding catheter placement. Lidocaine applied to urethra prior to insertion.    14 Albanian coude inserted. No return of urine noted, MD aware. Placement was assessed by another RN because there was no return of urine. Balloon inflated with no difficulty and no discomfort from patient. MD updated on placement and need for verification to occur.

## 2022-11-26 NOTE — PHARMACY-ANTICOAGULATION SERVICE
Clinical Pharmacy - Warfarin Dosing Consult     Pharmacy has been consulted to manage this patient s warfarin therapy.  Indication: Atrial Fibrillation  Therapy Goal: INR 2-3  Warfarin Prior to Admission: Yes  Warfarin PTA Regimen: 4 mg Tues/Fri, 3 mg all other days  Significant drug interactions: ceftriaxone  Recent documented change in oral intake/nutrition: Unknown  Dose Comments: 4 mg    INR   Date Value Ref Range Status   11/25/2022 1.95 (H) 0.85 - 1.15 Final   02/18/2020 1.40 (H) 0.86 - 1.14 Final     Recommend warfarin 4 mg today.  Pharmacy will monitor Simon Martines daily and order warfarin doses to achieve specified goal.      Please contact pharmacy as soon as possible if the warfarin needs to be held for a procedure or if the warfarin goals change.      Amanda Sow, PharmD, BCPS

## 2022-11-26 NOTE — ANESTHESIA PREPROCEDURE EVALUATION
Anesthesia Pre-Procedure Evaluation    Patient: Simon Martines   MRN: [id number] : 1950        Procedure : Procedure(s):  CYSTOSCOPY, LEFT  URETERAL STENT PLACEMENT          Past Medical History:   Diagnosis Date     Anxiety      Arrhythmia     paroxysmal a fib      Ascending aorta dilatation (H)      Cancer (H)      Coronary artery disease      Depressive disorder      Diverticulitis      Hypertension      Renal disease     CKD     Stress fracture of foot       Past Surgical History:   Procedure Laterality Date     APPENDECTOMY       CARDIAC SURGERY      CABG     COMBINED CYSTOSCOPY, INSERT CATHETER URETER Bilateral 2020    Procedure: CYSTOSCOPY, URETERAL CATHETER INSERTION;  Surgeon: Simon Jesus MD;  Location:  OR     DAVINCI XI ASSISTED RESECTION RECTOSIGMOID N/A 2020    Procedure: ROBOTIC SIGMOID COLECTOMY, ROBOTIC MOBILIZATION OF SPLENIC FLEXURE;  Surgeon: Ethan Zuleta MD;  Location:  OR     GENITOURINARY SURGERY        Allergies   Allergen Reactions     Cats      Dust Mites      Pcn [Penicillins]      Tolerates cefazolin 22, cefdinir 22, 10/6/22     Pollen [Pollen Extract]       Social History     Tobacco Use     Smoking status: Never     Smokeless tobacco: Never   Substance Use Topics     Alcohol use: Yes      Wt Readings from Last 1 Encounters:   22 88.5 kg (195 lb)        Prior to Admission medications    Medication Sig Start Date End Date Taking? Authorizing Provider   acetaminophen (TYLENOL) 500 MG tablet Take 500 mg by mouth 2 times daily as needed for mild pain    Yes Reported, Patient   atorvastatin (LIPITOR) 40 MG tablet Take 40 mg by mouth At Bedtime   Yes Reported, Patient   furosemide (LASIX) 40 MG tablet Take 40 mg by mouth daily   Yes Reported, Patient   metoprolol succinate ER (TOPROL-XL) 100 MG 24 hr tablet Take 100 mg by mouth daily   Yes Reported, Patient   terazosin (HYTRIN) 5 MG capsule Take 5 mg by mouth 2 times daily   Yes  Reported, Patient   warfarin ANTICOAGULANT (COUMADIN) 1 MG tablet Take 3-4 mg by mouth daily Take:  On Tuesday and Friday 4 X 1mg = 4 mg dose.  On Monday,Wednesday,Thursday, Saturday and Sunday 3 X 1 mg = 3 mg dose.   Yes Reported, Patient   Wheat Dextrin (BENEFIBER PO) Take 1 packet by mouth 1-2 times a day   Yes Unknown, Entered By History   diphenhydrAMINE (BENADRYL) 25 MG tablet Take 25 mg by mouth daily as needed for allergies or sleep (spring and fall allergies)     Reported, Patient     ECG 2020: NSR   Stress 5/2019:  IMPRESSION    1. Myocardial perfusion was normal.    2. The pharmacologic stress ECG was negative for ischemia.    3. Overall left ventricular systolic function was normal without wall motion abnormalities. The post stress LVEF was calculated to be 72 %.    4. Adequate pharmacologic stress test with regadenoson and low level exercise.    5. Left ventricular cavity size was normal ( ml).    6. There were no prior studies available for comparison.       Anesthesia Evaluation            ROS/MED HX  ENT/Pulmonary:    (-) tobacco use, asthma and sleep apnea   Neurologic:     (+) no peripheral neuropathy  (-) no seizures, no CVA and migraines   Cardiovascular:     (+) Dyslipidemia hypertension--CAD ---Taking blood thinners dysrhythmias (paroxysmal), a-fib, valvular problems/murmurs hx AVR.     METS/Exercise Tolerance:     Hematologic:       Musculoskeletal:       GI/Hepatic: Comment: Hx diverticulitis   (-) GERD and liver disease   Renal/Genitourinary:     (+) renal disease, type: ARF,     Endo:    (-) Type I DM, Type II DM, thyroid disease and obesity   Psychiatric/Substance Use:     (+) psychiatric history anxiety and depression     Infectious Disease:    (-) Recent Fever   Malignancy:   (+) Malignancy, History of Other.Other CA renal CC on right - s/p nephrectomy, now RCC on left status post.    Other:            Physical Exam    Airway        Mallampati: III   TM distance: > 3 FB   Neck  ROM: full   Mouth opening: > 3 cm    Respiratory Devices and Support         Dental  no notable dental history         Cardiovascular   cardiovascular exam normal       Rhythm and rate: normal     Pulmonary   pulmonary exam normal        breath sounds clear to auscultation           OUTSIDE LABS:  CBC:   Lab Results   Component Value Date    WBC 9.3 11/26/2022    WBC 8.8 11/25/2022    HGB 9.0 (L) 11/26/2022    HGB 9.9 (L) 11/25/2022    HCT 28.6 (L) 11/26/2022    HCT 30.5 (L) 11/25/2022     11/26/2022     11/25/2022     BMP:   Lab Results   Component Value Date     11/26/2022     11/25/2022    POTASSIUM 4.7 11/26/2022    POTASSIUM 4.4 11/25/2022    CHLORIDE 105 11/26/2022    CHLORIDE 102 11/25/2022    CO2 20 11/26/2022    CO2 23 11/25/2022    BUN 71 (H) 11/26/2022    BUN 65 (H) 11/25/2022    CR 9.79 (H) 11/26/2022    CR 8.16 (H) 11/25/2022     (H) 11/26/2022     (H) 11/25/2022     COAGS:   Lab Results   Component Value Date    INR 1.95 (H) 11/25/2022     POC:   Lab Results   Component Value Date     (H) 02/20/2020     HEPATIC:   Lab Results   Component Value Date    ALBUMIN 2.7 (L) 11/26/2022    PROTTOTAL 6.2 (L) 11/26/2022    ALT 10 11/26/2022    AST 7 11/26/2022    ALKPHOS 62 11/26/2022    BILITOTAL 0.7 11/26/2022     OTHER:   Lab Results   Component Value Date    GEORGES 8.0 (L) 11/26/2022    PHOS 3.5 02/19/2020    MAG 1.6 02/19/2020    LIPASE 450 (H) 11/25/2022    CRP 63.7 (H) 02/22/2014    SED 39 (H) 02/22/2014       Anesthesia Plan    ASA Status:  4   NPO Status:  NPO Appropriate    Anesthesia Type: General.     - Airway: ETT   Induction: Propofol.   Maintenance: Balanced.   Techniques and Equipment:     - Airway: Video-Laryngoscope         Consents    Anesthesia Plan(s) and associated risks, benefits, and realistic alternatives discussed. Questions answered and patient/representative(s) expressed understanding.    - Discussed:     - Discussed with:  Patient          Postoperative Care    Pain management: Multi-modal analgesia.   PONV prophylaxis: Ondansetron (or other 5HT-3), Dexamethasone or Solumedrol     Comments:                Bess Degroot MD

## 2022-11-26 NOTE — PHARMACY-ANTICOAGULATION SERVICE
Clinical Pharmacy - Warfarin Dosing Consult     Pharmacy has been consulted to manage this patient s warfarin therapy.  Indication: Atrial Fibrillation; AVR  Therapy Goal: INR 2-3  Provider/Team: Hospitalist  Warfarin Prior to Admission: Yes  Warfarin PTA Regimen: 4mg Tu & Fri and 3mg ROW  Significant drug interactions: ceftriaxone  Recent documented change in oral intake/nutrition: Unknown (NPO prior to procedure 11/26, noted episodes of diarrhea)    INR   Date Value Ref Range Status   11/26/2022 2.13 (H) 0.85 - 1.15 Final   11/25/2022 1.95 (H) 0.85 - 1.15 Final       Recommend hold warfarin tonight as INR increased after receiving Vit K this morning, unsure if INR increasing on own. INR ordered for AM 11/27, can consider administer warfarin early if INR decreases. I text paged Dr Gale with plan.  Pharmacy will monitor Simon Martines daily and order warfarin doses to achieve specified goal.      Please contact pharmacy as soon as possible if the warfarin needs to be held for a procedure or if the warfarin goals change.

## 2022-11-27 LAB
ANION GAP SERPL CALCULATED.3IONS-SCNC: 7 MMOL/L (ref 3–14)
BUN SERPL-MCNC: 72 MG/DL (ref 7–30)
CALCIUM SERPL-MCNC: 8.3 MG/DL (ref 8.5–10.1)
CHLORIDE BLD-SCNC: 111 MMOL/L (ref 94–109)
CO2 SERPL-SCNC: 20 MMOL/L (ref 20–32)
CREAT SERPL-MCNC: 7.84 MG/DL (ref 0.66–1.25)
ENTEROCOCCUS FAECALIS: NOT DETECTED
ENTEROCOCCUS FAECIUM: NOT DETECTED
GFR SERPL CREATININE-BSD FRML MDRD: 7 ML/MIN/1.73M2
GLUCOSE BLD-MCNC: 103 MG/DL (ref 70–99)
INR PPP: 1.9 (ref 0.85–1.15)
LISTERIA SPECIES (DETECTED/NOT DETECTED): NOT DETECTED
POTASSIUM BLD-SCNC: 4.5 MMOL/L (ref 3.4–5.3)
SODIUM SERPL-SCNC: 138 MMOL/L (ref 133–144)
STAPHYLOCOCCUS AUREUS: NOT DETECTED
STAPHYLOCOCCUS EPIDERMIDIS: DETECTED
STAPHYLOCOCCUS LUGDUNENSIS: NOT DETECTED
STREPTOCOCCUS AGALACTIAE: NOT DETECTED
STREPTOCOCCUS ANGINOSUS GROUP: NOT DETECTED
STREPTOCOCCUS PNEUMONIAE: NOT DETECTED
STREPTOCOCCUS PYOGENES: NOT DETECTED
STREPTOCOCCUS SPECIES: NOT DETECTED

## 2022-11-27 PROCEDURE — 99232 SBSQ HOSP IP/OBS MODERATE 35: CPT | Performed by: INTERNAL MEDICINE

## 2022-11-27 PROCEDURE — 80048 BASIC METABOLIC PNL TOTAL CA: CPT | Performed by: INTERNAL MEDICINE

## 2022-11-27 PROCEDURE — 250N000011 HC RX IP 250 OP 636: Performed by: HOSPITALIST

## 2022-11-27 PROCEDURE — 250N000013 HC RX MED GY IP 250 OP 250 PS 637: Performed by: HOSPITALIST

## 2022-11-27 PROCEDURE — 99233 SBSQ HOSP IP/OBS HIGH 50: CPT | Performed by: INTERNAL MEDICINE

## 2022-11-27 PROCEDURE — 85610 PROTHROMBIN TIME: CPT | Performed by: HOSPITALIST

## 2022-11-27 PROCEDURE — 250N000013 HC RX MED GY IP 250 OP 250 PS 637: Performed by: UROLOGY

## 2022-11-27 PROCEDURE — 258N000002 HC RX IP 258 OP 250: Performed by: INTERNAL MEDICINE

## 2022-11-27 PROCEDURE — 36415 COLL VENOUS BLD VENIPUNCTURE: CPT | Performed by: HOSPITALIST

## 2022-11-27 PROCEDURE — 120N000001 HC R&B MED SURG/OB

## 2022-11-27 RX ORDER — WARFARIN SODIUM 1 MG/1
3 TABLET ORAL
Status: COMPLETED | OUTPATIENT
Start: 2022-11-27 | End: 2022-11-27

## 2022-11-27 RX ADMIN — WARFARIN SODIUM 3 MG: 1 TABLET ORAL at 18:12

## 2022-11-27 RX ADMIN — ATORVASTATIN CALCIUM 40 MG: 40 TABLET, FILM COATED ORAL at 21:32

## 2022-11-27 RX ADMIN — TERAZOSIN HYDROCHLORIDE 5 MG: 5 CAPSULE ORAL at 09:35

## 2022-11-27 RX ADMIN — SODIUM CHLORIDE: 4.5 INJECTION, SOLUTION INTRAVENOUS at 12:10

## 2022-11-27 RX ADMIN — METOPROLOL SUCCINATE 100 MG: 100 TABLET, EXTENDED RELEASE ORAL at 09:35

## 2022-11-27 RX ADMIN — ACETAMINOPHEN 975 MG: 325 TABLET, FILM COATED ORAL at 20:47

## 2022-11-27 RX ADMIN — ACETAMINOPHEN 975 MG: 325 TABLET, FILM COATED ORAL at 13:18

## 2022-11-27 RX ADMIN — TERAZOSIN HYDROCHLORIDE 5 MG: 5 CAPSULE ORAL at 20:47

## 2022-11-27 RX ADMIN — CEFTRIAXONE SODIUM 1 G: 1 INJECTION, POWDER, FOR SOLUTION INTRAMUSCULAR; INTRAVENOUS at 20:47

## 2022-11-27 ASSESSMENT — ACTIVITIES OF DAILY LIVING (ADL)
ADLS_ACUITY_SCORE: 22
ADLS_ACUITY_SCORE: 24
ADLS_ACUITY_SCORE: 24
ADLS_ACUITY_SCORE: 22
ADLS_ACUITY_SCORE: 24
ADLS_ACUITY_SCORE: 22
ADLS_ACUITY_SCORE: 24
ADLS_ACUITY_SCORE: 22
ADLS_ACUITY_SCORE: 24
ADLS_ACUITY_SCORE: 22

## 2022-11-27 NOTE — PLAN OF CARE
Shift Note:     [id number]    Pt arrived from PACU at 1300, had cystoscopy, L retrograde pyelogram and left uretal stent placement done. A/Ox4. VSS on RA, Capno WDL. A1 w/ GB + W, has not been OOB this shift. Denies pain, has scheduled tylenol. Low Fiber Diet, ADAT. Continent of bowels. Montgomery in place. Upon arrival from PACU, urine was leaking around the montgomery. MD consulted, advised it was due to overfull bladder from not being able to void for 2 days. As UOP has decreased, it has not leaked. Large urine output this shift. R PIV infusing at 100ml/hr 0.45% NS. UA obtained. INR monitored for warfarin, warfarin held this shift. INR: 2.3. Creatinine 9.79. Urology and Nephrology following. Discharge home pending medical improvement.

## 2022-11-27 NOTE — PROGRESS NOTES
X-cover    Blood culture with GPC in clusters x 1 bottle. Afebrile, no WBC, on ceftx for UTI.   - await verigene  - avoid vanco if needed given renal failure    Mynor Kang MD

## 2022-11-27 NOTE — PLAN OF CARE
VSS. A&O x4. Denies pain. C/o intermittent spasms with montgomery catheter. Adequate urine output. Patient hoping to have montgomery removed today. Blood culture result positive, MD notified. On IV rocephin. Up SBA. Discharge pending kidney function.

## 2022-11-27 NOTE — PROVIDER NOTIFICATION
Dr Kang paged regarding patient's positive blood culture results. Gram positive cocci in clusters.

## 2022-11-27 NOTE — PROGRESS NOTES
[facility]    Medicine Progress Note - Hospitalist Service    Date of Admission:  11/25/2022    Assessment & Plan   Simon Martines is a 72 year old male with a past medical history of renal cell carcinoma, coronary artery disease, hypertension, aortic valve replacement, anemia, CKD stage IV presents to hospital with oliguria and renal failure.    Acute renal failure on CKD stage 4  Oliguria  Status post right nephrectomy  Patient is status post right nephrectomy in 2010 for renal cell carcinoma then developed renal cell carcinoma on the left status post ablation at the [facility] in September 2022, and and stent removal on November 9.  Baseline creatinine has been at 2.5 since the ablation.  He presents to hospital due to lack of urine output.  He last urinated approximately 24 hours prior to presentation.  On labs his creatinine is elevated to 8.16.  Not entirely clear why the patient is in acute renal failure.  He does report some chills and there is some fat stranding on CT therefore I am starting the patient on ceftriaxone.  I will also consult nephrology given the severity of his renal dysfunction. The case was discussed with urology in the ed and its suspected that the patient has developed an obstruction and the site of his prior stent and percutaneous drainage was recommended. IR was consulted and plan to place a drain in the morning. Vitamin k 5mg given to reverse his INR.   CT abdomen: Mild left hydronephrosis without obstructive lesion or stone material.  Mild left perinephric soft tissue stranding.  -IV hydration  -Avoid nephrotoxic medication  -Strict I's and O's with Lan catheter, daily weights  -Follow-up nephrology consult, follow-up ir, urology  -Follow-up UA if able to obtain urine,   -Follow-up blood cultures  -Continue terazosin    S/P cystoscopy and left ureteral stenting by urology 11/26, appreciate urology help  Nephrology consulted , appreciate input, no RRT  "planned  Fluids and antibiotics Rocephin    Creatinine improving, baseline Creatinine around 2.5 per patient.      Bacteremia, possible UTI  Blood culture positive Gram positive Cocci, staph Epidermidis, possible contaminant.  Continue Rocephin    Coronary artery disease  Hypertension  Status post aortic valve replacement  Paroxysmal atrial fibrillation  -Coumadin to be dosed by pharmacy  -Holding Lasix in setting of renal failure  -Continue metoprolol, atorvastatin    Restart Coumadin, pharmacy consulted.      Lipase elevated  Lipase mildly elevated.  Patient without any abdominal pain.  Likely has decreased clearance of lipase in setting of his acute renal failure.  Given lack of urine output I will not aggressively hydrate him    Anemia  Chronic.  Stable.  -Monitor    Diarrhea  -If he continues to have diarrhea, will obtain stool studies  Improving.     Diet: Advance Diet as Tolerated: Regular Diet Adult    DVT Prophylaxis: Warfarin  Lan Catheter: Not present  Central Lines: None  Cardiac Monitoring: None  Code Status: Full Code      Disposition Plan      Expected Discharge Date: 11/28/2022                The patient's care was discussed with the Patient.    Dianna Gale MD  Hospitalist Service  [facility]  Securely message with the Vocera Web Console (learn more here)  Text page via [employer] Paging/Directory         Clinically Significant Risk Factors         # Hyponatremia: Lowest Na = 134 mmol/L (Ref range: 136-145) in last 2 days, will monitor as appropriate      # Hypoalbuminemia: Lowest albumin = 2.7 g/dL (Ref range: 3.5-5.2) at 11/26/2022  6:18 AM, will monitor as appropriate           # Overweight: Estimated body mass index is 25.04 kg/m  as calculated from the following:    Height as of this encounter: 1.88 m (6' 2\").    Weight as of this encounter: 88.5 kg (195 lb)., PRESENT ON ADMISSION     "     ______________________________________________________________________    Interval History        Patient doing allright, denies any pain, nausea or vomiting.  Having a lot of diuresis.      Data reviewed today: I reviewed all medications, new labs and imaging results over the last 24 hours. I personally reviewed no images or EKG's today.    Physical Exam   Vital Signs: Temp: 98  F (36.7  C) Temp src: Oral BP: (!) 149/77 Pulse: 51   Resp: 18 SpO2: 98 % O2 Device: None (Room air)    Weight: 195 lbs 0 oz  General Appearance:  no distress  Respiratory: Lungs clear  Cardiovascular: Rate controlled  GI: Nontender  Skin: No rash  Extremities: No edema      Data   Recent Labs   Lab 11/27/22  0815 11/26/22  1604 11/26/22  0905 11/26/22  0618 11/25/22  1423   WBC  --   --   --  9.3 8.8   HGB  --   --   --  9.0* 9.9*   MCV  --   --   --  90 89   PLT  --   --   --  159 171   INR 1.90* 2.13*  --   --  1.95*     --   --  136 134   POTASSIUM 4.5  --   --  4.7 4.4   CHLORIDE 111*  --   --  105 102   CO2 20  --   --  20 23   BUN 72*  --   --  71* 65*   CR 7.84*  --   --  9.79* 8.16*   ANIONGAP 7  --   --  11 9   GEORGES 8.3*  --   --  8.0* 8.3*   *  --  100* 103* 123*   ALBUMIN  --   --   --  2.7* 3.0*   PROTTOTAL  --   --   --  6.2* 6.9   BILITOTAL  --   --   --  0.7 0.8   ALKPHOS  --   --   --  62 74   ALT  --   --   --  10 12   AST  --   --   --  7 11   LIPASE  --   --   --   --  450*     No results found for this or any previous visit (from the past 24 hour(s)).  Medications    NaCl 100 mL/hr at 11/26/22 1948      acetaminophen  975 mg Oral Q8H    atorvastatin  40 mg Oral At Bedtime    cefTRIAXone  1 g Intravenous QPM    metoprolol succinate ER  100 mg Oral Daily    polyethylene glycol  17 g Oral Daily    senna-docusate  1 tablet Oral BID    sodium chloride (PF)  3 mL Intracatheter Q8H    terazosin  5 mg Oral BID    warfarin ANTICOAGULANT  3 mg Oral ONCE at 18:00    Warfarin Therapy Reminder  1 each Oral See Admin  Instructions

## 2022-11-27 NOTE — PROGRESS NOTES
"UROLOGY PROGRESS NOTE  Feeling much better this AM  Excellent UOP since stent placement    Vital signs:  Temp: 97.9  F (36.6  C) Temp src: Oral BP: (!) 161/91 Pulse: 63   Resp: 18 SpO2: 97 % O2 Device: None (Room air) Oxygen Delivery: 1 LPM Height: 188 cm (6' 2\") Weight: 88.5 kg (195 lb)  Estimated body mass index is 25.04 kg/m  as calculated from the following:    Height as of this encounter: 1.88 m (6' 2\").    Weight as of this encounter: 88.5 kg (195 lb).    Intake/Output Summary (Last 24 hours) at 11/27/2022 0643  Last data filed at 11/27/2022 0600  Gross per 24 hour   Intake 2397 ml   Output 3450 ml   Net -1053 ml      Alert and oriented  Breathing unlabored  Lan draining clear urine  Extremities warm and well perfused, no edema    A/P: Left UPJ stricture after embolization/cryotherapy at [place], POD 1 s/p cystoscopy and stent placement    Overall improving, follow creatinine and sodium levels with postobstructive diuresis  Lan will be removed today  One of his blood cultures is positive for G+ cocci (contaminant?); currently on IV Rocephin  Discussed operative findings with him; advised he follow up with [place] urologist after hospital discharge for long term management of ureteral stricture complication    Ryan Walter MD  [facility]  [phone]          "

## 2022-11-27 NOTE — PROGRESS NOTES
" Renal Medicine Progress Note            Assessment/Plan:     72 y.o man with CKD IV and RCC, admitted for severe WES from obstructive uropathy.      # CKD IV: Baseline ~ 2.5 mg/dl and eGFR in the 20s ml/min He had a SCr of 2.58 mg/dl and an eGFR of 26 ml/mon on 10/21/2022.                -follows with Dr. Jackson.     # Severe WES due to obstructive uropathy: Scr is better but still high.      # RCC s/p radical right nephrectomy in 2010.      # 5.5 cm left renal mass s/p elective renal artery embolization and cryoablation in mid September.      # Mild metabolic acidosis from severe renal failure.      # Anemia: Hgb is not bad.      #  HTN: BP is acceptable. Toprol  mg daily     Plan:  # Can stop IVF after this bag is done.   # Bladder scan qshift  # Renal panel daily          Interval History:     His [place] is removed.  He is concerned that he is making less urine.   He has urinary urgency and frequency.           Medications and Allergies:       acetaminophen  975 mg Oral Q8H     atorvastatin  40 mg Oral At Bedtime     cefTRIAXone  1 g Intravenous QPM     metoprolol succinate ER  100 mg Oral Daily     polyethylene glycol  17 g Oral Daily     senna-docusate  1 tablet Oral BID     sodium chloride (PF)  3 mL Intracatheter Q8H     terazosin  5 mg Oral BID     warfarin ANTICOAGULANT  3 mg Oral ONCE at 18:00     Warfarin Therapy Reminder  1 each Oral See Admin Instructions        Allergies   Allergen Reactions     Cats      Dust Mites      Pcn [Penicillins]      Tolerates cefazolin 9/16/22, cefdinir 9/19/22, 10/6/22     Pollen [Pollen Extract]             Physical Exam:   Vitals were reviewed   , Blood pressure (!) 149/77, pulse 51, temperature 98  F (36.7  C), temperature source Oral, resp. rate 18, height 1.88 m (6' 2\"), weight 88.5 kg (195 lb), SpO2 98 %.    Wt Readings from Last 3 Encounters:   11/25/22 88.5 kg (195 lb)   02/19/20 91.7 kg (202 lb 3.2 oz)   08/15/17 95.3 kg (210 lb 3.2 oz)       Intake/Output " Summary (Last 24 hours) at 11/27/2022 1447  Last data filed at 11/27/2022 1317  Gross per 24 hour   Intake 2057 ml   Output 3355 ml   Net -1298 ml     GENERAL: pleasant, alert, NAD  HEENT:  Normocephalic. No gross abnormalities.  Pupils equal.  MMM.  Dentition is ok.  CV: RRR, +murmurs, no clicks, gallops, or rubs, no edema  RESP: Clear bilaterally with good efforts  GI: Abdomen Soft, NT. ND  MUSCULOSKELETAL: extremities nl - no gross deformities noted. No edema.   SKIN: no suspicious lesions or rashes, dry to touch  NEURO:  Strength normal and symmetric. Awake, alert and conversing normally  PSYCH: mood good, affect appropriate         Data:     CBC RESULTS:     Recent Labs   Lab 11/26/22  0618 11/25/22  1423   WBC 9.3 8.8   RBC 3.17* 3.43*   HGB 9.0* 9.9*   HCT 28.6* 30.5*    171       Basic Metabolic Panel:  Recent Labs   Lab 11/27/22  0815 11/26/22  0905 11/26/22  0618 11/25/22  1423     --  136 134   POTASSIUM 4.5  --  4.7 4.4   CHLORIDE 111*  --  105 102   CO2 20  --  20 23   BUN 72*  --  71* 65*   CR 7.84*  --  9.79* 8.16*   * 100* 103* 123*   GEORGES 8.3*  --  8.0* 8.3*       INR  Recent Labs   Lab 11/27/22  0815 11/26/22  1604 11/25/22  1423   INR 1.90* 2.13* 1.95*      Attestation:   I have reviewed today's relevant vital signs, notes, medications, labs and imaging.    Camron Bauman MD  [facility]  Office phone :[phone]  Pager: [phone]

## 2022-11-28 VITALS
WEIGHT: 195.6 LBS | HEIGHT: 74 IN | DIASTOLIC BLOOD PRESSURE: 70 MMHG | SYSTOLIC BLOOD PRESSURE: 153 MMHG | TEMPERATURE: 97.8 F | BODY MASS INDEX: 25.1 KG/M2 | OXYGEN SATURATION: 97 % | RESPIRATION RATE: 18 BRPM | HEART RATE: 51 BPM

## 2022-11-28 LAB
ANION GAP SERPL CALCULATED.3IONS-SCNC: 7 MMOL/L (ref 3–14)
BUN SERPL-MCNC: 65 MG/DL (ref 7–30)
C DIFF TOX B STL QL: NEGATIVE
CALCIUM SERPL-MCNC: 8.2 MG/DL (ref 8.5–10.1)
CHLORIDE BLD-SCNC: 112 MMOL/L (ref 94–109)
CO2 SERPL-SCNC: 20 MMOL/L (ref 20–32)
CREAT SERPL-MCNC: 5.73 MG/DL (ref 0.66–1.25)
GFR SERPL CREATININE-BSD FRML MDRD: 10 ML/MIN/1.73M2
GLUCOSE BLD-MCNC: 95 MG/DL (ref 70–99)
GLUCOSE BLDC GLUCOMTR-MCNC: 94 MG/DL (ref 70–99)
INR PPP: 1.5 (ref 0.85–1.15)
POTASSIUM BLD-SCNC: 4.1 MMOL/L (ref 3.4–5.3)
SODIUM SERPL-SCNC: 139 MMOL/L (ref 133–144)

## 2022-11-28 PROCEDURE — 87493 C DIFF AMPLIFIED PROBE: CPT | Performed by: INTERNAL MEDICINE

## 2022-11-28 PROCEDURE — 250N000013 HC RX MED GY IP 250 OP 250 PS 637: Performed by: HOSPITALIST

## 2022-11-28 PROCEDURE — 36415 COLL VENOUS BLD VENIPUNCTURE: CPT | Performed by: INTERNAL MEDICINE

## 2022-11-28 PROCEDURE — 250N000013 HC RX MED GY IP 250 OP 250 PS 637: Performed by: UROLOGY

## 2022-11-28 PROCEDURE — 99231 SBSQ HOSP IP/OBS SF/LOW 25: CPT | Performed by: INTERNAL MEDICINE

## 2022-11-28 PROCEDURE — 87040 BLOOD CULTURE FOR BACTERIA: CPT | Performed by: INTERNAL MEDICINE

## 2022-11-28 PROCEDURE — 99239 HOSP IP/OBS DSCHRG MGMT >30: CPT | Performed by: INTERNAL MEDICINE

## 2022-11-28 PROCEDURE — 80048 BASIC METABOLIC PNL TOTAL CA: CPT | Performed by: INTERNAL MEDICINE

## 2022-11-28 PROCEDURE — 85610 PROTHROMBIN TIME: CPT | Performed by: HOSPITALIST

## 2022-11-28 PROCEDURE — 36415 COLL VENOUS BLD VENIPUNCTURE: CPT | Performed by: HOSPITALIST

## 2022-11-28 RX ORDER — WARFARIN SODIUM 5 MG/1
5 TABLET ORAL
Status: DISCONTINUED | OUTPATIENT
Start: 2022-11-28 | End: 2022-11-28 | Stop reason: HOSPADM

## 2022-11-28 RX ORDER — CEFDINIR 300 MG/1
300 CAPSULE ORAL DAILY
Qty: 5 CAPSULE | Refills: 0 | Status: SHIPPED | OUTPATIENT
Start: 2022-11-28 | End: 2022-12-03

## 2022-11-28 RX ADMIN — METOPROLOL SUCCINATE 100 MG: 100 TABLET, EXTENDED RELEASE ORAL at 08:20

## 2022-11-28 RX ADMIN — TERAZOSIN HYDROCHLORIDE 5 MG: 5 CAPSULE ORAL at 08:20

## 2022-11-28 RX ADMIN — ACETAMINOPHEN 975 MG: 325 TABLET, FILM COATED ORAL at 06:27

## 2022-11-28 ASSESSMENT — ACTIVITIES OF DAILY LIVING (ADL)
ADLS_ACUITY_SCORE: 22

## 2022-11-28 NOTE — PROVIDER NOTIFICATION
MD Notification    Notified Person: MD    Notified Person Name: Jorge Luis    Notification Date/Time: 0054 11/28/22    Notification Interaction: AMCOM    Purpose of Notification: Pt requesting PRN Imodium for diarrhea, reports 3+ loose stools. Can this be ordered?    Orders Received: labs ordered, precautionary enteric precaution for C. Diff rule out    Comments:

## 2022-11-28 NOTE — CONSULTS
Writer met with patient and spouse regarding discharge planning. Patient was dressed and ready to go. Writer went over discharge recommendations to include labs with PCP by 12/1 ([facility]) and follow up with Urology with [place]. Patient and Spouse acknowledged both and declined the need for any discharge planning services. Will send a hand off to patient's PCP.  No further needs identified. Consult Cleared.        Meghan Patiño RN, BSN, ACM   Care Transitions Specialist   [facility]  Care Transitions Specialist   [address] [place] [address]  [email or web address]  Office:[phone] Fax: [phone]  [facility]

## 2022-11-28 NOTE — PLAN OF CARE
[id number]    A/Ox4. VSS on RA. Up ad sandie. Walked in persaud x2. Denies pain, has scheduled tylenol. Regular diet, tolerating well. Continent of bowels. Aln removed, pt voiding, negative residuals. Experiencing urgency and frequency. Scan once per shift per MD. R PIV infusing at 100ml/hr 0.45% NS, discontinue after bag is complete. Creatinine 7.84. Urology and Nephrology following. Discharge home pending kidney function improvement.

## 2022-11-28 NOTE — PLAN OF CARE
Goal Outcome Evaluation:  Discharge    Patient discharged to door 2 via wheelchair with nursing assistant  Care plan note: A&O. VSS. Regular diet. Up independent. Denies pain.     Listed belongings gathered and given to patient (including from security/pharmacy). Yes  Care Plan and Patient education resolved: Yes  Prescriptions if needed, hard copies sent with patient  Yes, filled and sent with patient  Medication Bin checked and emptied on discharge Yes  SW/care coordinator/charge RN aware of discharge: Yes

## 2022-11-28 NOTE — PROGRESS NOTES
Urology     Creatinine 7.84-- pending.   Lan removed 11/27, has been voiding well with negative PVR per nursing notes.       A/P: Left UPJ stricture after embolization/cryotherapy at [place], POD 2 s/p cystoscopy and left ureteral stent placement     Follow creatinine and sodium levels, nephrology following.   He will need follow up with [place] urologist after hospital discharge for long term management of ureteral stricture complication  He may experience urinary urgency, frequency, flank pain, blood in urine and bladder spasms in setting of stent.     Okay to discharge home from a urology perspective. Needs improvement of creatinine prior to discharge.     Amanda Bean PA-C   [facility]  Office Phone: [phone]  After 4pm and on weekends, please call [phone].

## 2022-11-28 NOTE — DISCHARGE INSTRUCTIONS
You have a ureteral stent in place. This is a temporary tube that will help your kidney drain appropriately after your procedure.    Most people notice three symptoms when they have a stent:    1)  Bladder spasms   - You may feel the urge to urinate frequently while the stent is in.   - You may have occasional bladder spasms where you feel pain in the bladder area.   - You may have been prescribed a medicine like Detrol or Ditropan to help with these symptoms.    2) Blood in the urine   - Usually this is light pink .   - If you see blood in the urine, rest and drink plenty of fluids.    3) Pain in the kidney area   - This usually happens right after urination and goes away pretty quickly.   - You can take tylenol for the pain .          You need to call and make a follow up appointment with you primary Urologist at [facility] for continued care and long term plan for the ureteral stent.        Provider Procedure Text (A): After obtaining clear surgical margins the defect was repaired by another provider.

## 2022-11-28 NOTE — PROGRESS NOTES
" Renal Medicine Progress Note            Assessment/Plan:     72 y.o man with CKD IV and RCC, admitted for severe WES from obstructive uropathy.      # CKD IV: Baseline ~ 2.5 mg/dl and eGFR in the 20s ml/min He had a SCr of 2.58 mg/dl and an eGFR of 26 ml/mon on 10/21/2022.                -follows with Dr. Jackson.      # Severe WES due to obstructive uropathy: Scr is better but still high.      # RCC s/p radical right nephrectomy in 2010.      # 5.5 cm left renal mass s/p elective renal artery embolization and cryoablation in mid September.      # Mild metabolic acidosis from severe renal failure.      # Anemia: Hgb is not bad.      #  HTN: BP is acceptable. Toprol  mg daily     Plan:  # Check renal panel on Thursday and fax result to his nephrologist, Dr. Jackson.         Interval History:     Afebrile. VSS.   Excellent urine output.  Scr continues to improve.  Feels well.  Wants to go home.          Medications and Allergies:       acetaminophen  975 mg Oral Q8H     atorvastatin  40 mg Oral At Bedtime     cefTRIAXone  1 g Intravenous QPM     metoprolol succinate ER  100 mg Oral Daily     polyethylene glycol  17 g Oral Daily     senna-docusate  1 tablet Oral BID     sodium chloride (PF)  3 mL Intracatheter Q8H     terazosin  5 mg Oral BID     Warfarin Therapy Reminder  1 each Oral See Admin Instructions        Allergies   Allergen Reactions     Cats      Dust Mites      Pcn [Penicillins]      Tolerates cefazolin 9/16/22, cefdinir 9/19/22, 10/6/22     Pollen [Pollen Extract]             Physical Exam:   Vitals were reviewed   , Blood pressure (!) 153/70, pulse 51, temperature 97.8  F (36.6  C), temperature source Oral, resp. rate 18, height 1.88 m (6' 2\"), weight 88.7 kg (195 lb 9.6 oz), SpO2 97 %.    Wt Readings from Last 3 Encounters:   11/28/22 88.7 kg (195 lb 9.6 oz)   02/19/20 91.7 kg (202 lb 3.2 oz)   08/15/17 95.3 kg (210 lb 3.2 oz)       Intake/Output Summary (Last 24 hours) at 11/28/2022 1110  Last data " filed at 11/28/2022 1050  Gross per 24 hour   Intake 1560 ml   Output 2580 ml   Net -1020 ml     GENERAL: pleasant, alert, NAD  HEENT:  Normocephalic. No gross abnormalities.  Pupils equal.  MMM.  Dentition is ok.  CV: RRR, +murmurs, no clicks, gallops, or rubs, no edema  RESP: Clear bilaterally with good efforts  GI: Abdomen Soft, NT. ND  MUSCULOSKELETAL: extremities nl - no gross deformities noted. No edema.   SKIN: no suspicious lesions or rashes, dry to touch  NEURO:  Strength normal and symmetric. Awake, alert and conversing normally  PSYCH: mood good, affect appropriate         Data:     CBC RESULTS:     Recent Labs   Lab 11/26/22  0618 11/25/22  1423   WBC 9.3 8.8   RBC 3.17* 3.43*   HGB 9.0* 9.9*   HCT 28.6* 30.5*    171       Basic Metabolic Panel:  Recent Labs   Lab 11/28/22  0929 11/28/22  0620 11/27/22  0815 11/26/22  0905 11/26/22  0618 11/25/22  1423     --  138  --  136 134   POTASSIUM 4.1  --  4.5  --  4.7 4.4   CHLORIDE 112*  --  111*  --  105 102   CO2 20  --  20  --  20 23   BUN 65*  --  72*  --  71* 65*   CR 5.73*  --  7.84*  --  9.79* 8.16*   GLC 95 94 103* 100* 103* 123*   GEORGES 8.2*  --  8.3*  --  8.0* 8.3*       INR  Recent Labs   Lab 11/28/22  0929 11/27/22  0815 11/26/22  1604 11/25/22  1423   INR 1.50* 1.90* 2.13* 1.95*      Attestation:   I have reviewed today's relevant vital signs, notes, medications, labs and imaging.    Camron Bauman MD  [facility]  Office phone :[phone]  Pager: [phone]

## 2022-11-28 NOTE — PLAN OF CARE
Goal Outcome Evaluation:       [id number]  Pt A/O x4, anxious. VSS on RA. PIV SL w/ int abx. C/o loose stool, stool sample collected. On enteric prec for C.Diff rule out. Cont B/B, urinary urgency/frequency. Up ind in room. Tolerating regular diet. Discharge pending kidney function and lab results.

## 2022-11-28 NOTE — DISCHARGE SUMMARY
[facility]    Discharge Summary  Hospitalist    Date of Admission:  11/25/2022  Date of Discharge:  11/28/2022  Discharging Provider: Debbie Romero MD    Discharge Diagnoses   History of renal cell carcinoma  Status post nephrectomy in the past  Acute kidney injury superimposed on stage IIIb chronic kidney disease.    History of Present Illness   Simon Suero please review admission history and physical.    Hospital Course   Simon Martines was admitted on 11/25/2022.  The following problems were addressed during his hospitalization:    Principal Problem:    History of renal carcinoma  Active Problems:    S/p nephrectomy    Postoperative state    Acute renal failure superimposed on stage 3b chronic kidney disease, unspecified acute renal failure type (H)  Simon Martines is a 72 year old male with a past medical history of renal cell carcinoma, coronary artery disease, hypertension, aortic valve replacement, anemia, CKD stage IV presents to hospital with oliguria and renal failure.     Acute renal failure on CKD stage 4  Oliguria  Status post right nephrectomy  Patient is status post right nephrectomy in 2010 for renal cell carcinoma then developed renal cell carcinoma on the left status post ablation at the [facility] in September 2022, and and stent removal on November 9.  Baseline creatinine has been at 2.5 since the ablation.  He presents to hospital due to lack of urine output.  He last urinated approximately 24 hours prior to presentation.  On labs his creatinine is elevated to 8.16.  Not entirely clear why the patient is in acute renal failure.  He does report some chills and there is some fat stranding on CT therefore I am starting the patient on ceftriaxone.  I will also consult nephrology given the severity of his renal dysfunction. The case was discussed with urology in the ed and its suspected that the patient has developed an obstruction and the site of his prior stent and  percutaneous drainage was recommended. IR was consulted and plan to place a drain in the morning. Vitamin k 5mg given to reverse his INR. CT abdomen: Mild left hydronephrosis without obstructive lesion or stone material.  Mild left perinephric soft tissue stranding.  Patient was on hydration, seen by nephrology services, Lasix was on hold, his creatinine is slowly trending down, his terazosin was continued, he was seen by urology service.  Urine culture was not obtained at this admission.  UA looked abnormal.  There was large amount of blood in the urine.S/P cystoscopy and left ureteral stenting by urology 11/26, appreciate urology help  Plan is to discharge patient home with outpatient BMP check in 2 days, this will be faxed to patient's primary nephrologist and Dr. Jackson.        Bacteremia, possible UTI  Staph epidermidis 2/2 positive blood culture  Patient was on Rocephin for treatment of UTI, will complete the course, meantime one of his blood culture from 11/24 is negative while 11/25 is positive2/2 positive for staph epidermidis.  Repeat blood cultures obtained on 11/28, and that is so far negative, will need follow-up on this blood culture to make sure 11/25 is a contaminant.  This has been added to the discharge follow-up request.     Coronary artery disease  Hypertension  Status post aortic valve replacement  Paroxysmal atrial fibrillation  Continue metoprolol, atorvastatin, warfarin to continue, Lasix is on hold due to WES.        Lipase elevated  Lipase mildly elevated.  Patient without any abdominal pain.  Likely has decreased clearance of lipase in setting of his acute renal failure.    He did not have any symptoms of pancreatitis.     Anemia  Chronic.  Stable.       Diarrhea  Resolved possibly secondary to antibiotics        Debbie Romero MD    Significant Results and Procedures       Pending Results   These results will be followed up by primary care during next follow-up visit.  Unresulted Labs  Ordered in the Past 30 Days of this Admission     Date and Time Order Name Status Description    11/28/2022  8:54 AM Blood Culture Arm, Left In process     11/28/2022  8:54 AM Blood Culture Arm, Right In process     11/25/2022  2:16 PM Blood Culture Peripheral Blood Preliminary     11/25/2022  2:16 PM Blood Culture Peripheral Blood Preliminary           Code Status   Full Code       Primary Care Physician   Henrry Scott    Physical Exam   Temp: 97.8  F (36.6  C) Temp src: Oral BP: (!) 153/70 Pulse: 51   Resp: 18 SpO2: 97 % O2 Device: None (Room air)    Vitals:    11/25/22 1411 11/28/22 0600   Weight: 88.5 kg (195 lb) 88.7 kg (195 lb 9.6 oz)     Vital Signs with Ranges  Temp:  [97.8  F (36.6  C)-97.9  F (36.6  C)] 97.8  F (36.6  C)  Pulse:  [49-53] 51  Resp:  [18] 18  BP: (129-172)/(70-84) 153/70  Cuff Mean (mmHg):  [98] 98  SpO2:  [97 %] 97 %  I/O last 3 completed shifts:  In: 1260 [P.O.:1260]  Out: 3055 [Urine:3055]    The patient was examined on the day of discharge.    Discharge Disposition   Discharged to home  Condition at discharge: Stable    Consultations This Hospital Stay   PHARMACY TO DOSE WARFARIN  NEPHROLOGY IP CONSULT  UROLOGY IP CONSULT  INTERVENTIONAL RADIOLOGY ADULT/PEDS IP CONSULT  PHARMACY IP CONSULT  CARE MANAGEMENT / SOCIAL WORK IP CONSULT  UROLOGY IP CONSULT    Time Spent on this Encounter   IDebbie MD, personally saw the patient today and spent greater than 30 minutes discharging this patient.    Discharge Orders      Reason for your hospital stay    Obstructive uropathy     Follow-up and recommended labs and tests     Follow up with primary care provider, Henrry Scott, within 7 days for hospital follow- up.  The following labs/tests are recommended: BMP to be done on 12/1.  Please send these results to Dr. Sandy.He will need follow up with [place] urologist after hospital discharge for long term management of ureteral stricture complication.  He may experience urinary  urgency, frequency, flank pain, blood in urine and bladder spasms in setting of stent.  Has a blood culture drawn on 11/28, this needs to be followed up to make sure it does not grow any bacteria.     Activity    Your activity upon discharge: activity as tolerated     Diet    Follow this diet upon discharge: Orders Placed This Encounter      Advance Diet as Tolerated: Regular Diet Adult     Discharge Medications   Current Discharge Medication List      START taking these medications    Details   cefdinir (OMNICEF) 300 MG capsule Take 1 capsule (300 mg) by mouth daily for 5 days  Qty: 5 capsule, Refills: 0    Associated Diagnoses: Acute renal failure superimposed on stage 3b chronic kidney disease, unspecified acute renal failure type (H)         CONTINUE these medications which have NOT CHANGED    Details   acetaminophen (TYLENOL) 500 MG tablet Take 500 mg by mouth 2 times daily as needed for mild pain       atorvastatin (LIPITOR) 40 MG tablet Take 40 mg by mouth At Bedtime      metoprolol succinate ER (TOPROL-XL) 100 MG 24 hr tablet Take 100 mg by mouth daily      terazosin (HYTRIN) 5 MG capsule Take 5 mg by mouth 2 times daily      warfarin ANTICOAGULANT (COUMADIN) 1 MG tablet Take 3-4 mg by mouth daily Take:  On Tuesday and Friday 4 X 1mg = 4 mg dose.  On Monday,Wednesday,Thursday, Saturday and Sunday 3 X 1 mg = 3 mg dose.      Wheat Dextrin (BENEFIBER PO) Take 1 packet by mouth 1-2 times a day      diphenhydrAMINE (BENADRYL) 25 MG tablet Take 25 mg by mouth daily as needed for allergies or sleep (spring and fall allergies)          STOP taking these medications       furosemide (LASIX) 40 MG tablet Comments:   Reason for Stopping:             Allergies   Allergies   Allergen Reactions     Cats      Dust Mites      Pcn [Penicillins]      Tolerates cefazolin 9/16/22, cefdinir 9/19/22, 10/6/22     Pollen [Pollen Extract]      Data   Most Recent 3 CBC's:Recent Labs   Lab Test 11/26/22  0618 11/25/22  1423  02/21/20  0901   WBC 9.3 8.8 8.0   HGB 9.0* 9.9* 10.2*   MCV 90 89 90    171 131*      Most Recent 3 BMP's:  Recent Labs   Lab Test 11/28/22  0929 11/28/22  0620 11/27/22  0815 11/26/22  0905 11/26/22  0618     --  138  --  136   POTASSIUM 4.1  --  4.5  --  4.7   CHLORIDE 112*  --  111*  --  105   CO2 20  --  20  --  20   BUN 65*  --  72*  --  71*   CR 5.73*  --  7.84*  --  9.79*   ANIONGAP 7  --  7  --  11   GEORGES 8.2*  --  8.3*  --  8.0*   GLC 95 94 103*   < > 103*    < > = values in this interval not displayed.     Most Recent 2 LFT's:  Recent Labs   Lab Test 11/26/22 0618 11/25/22  1423   AST 7 11   ALT 10 12   ALKPHOS 62 74   BILITOTAL 0.7 0.8     Most Recent INR's and Anticoagulation Dosing History:  Anticoagulation Dose History     Recent Dosing and Labs Latest Ref Rng & Units 2/18/2020 11/25/2022 11/26/2022 11/27/2022 11/28/2022    Warfarin 1 mg - - - - 3 mg -    INR 0.85 - 1.15 1.40(H) 1.95(H) 2.13(H) 1.90(H) 1.50(H)        Most Recent 3 Troponin's:No lab results found.  Most Recent Cholesterol Panel:No lab results found.  Most Recent 6 Bacteria Isolates From Any Culture (See EPIC Reports for Culture Details):No lab results found.  Most Recent TSH, T4 and A1c Labs:No lab results found.  Results for orders placed or performed during the hospital encounter of 11/25/22   CT Abdomen Pelvis w/o Contrast    Narrative    EXAM: CT ABDOMEN PELVIS W/O CONTRAST  LOCATION: [facility]  DATE/TIME: 11/25/2022 10:10 PM    INDICATION: Left flank pain, h/o R nephrectomy, acute-on-chronic renal insufficiency; CR too high for contrast.  COMPARISON: None.  TECHNIQUE: CT scan of the abdomen and pelvis was performed without IV contrast. Multiplanar reformats were obtained. Dose reduction techniques were used.  CONTRAST: None.    FINDINGS:   LOWER CHEST: Motion artifact degrades several images. Tiny left pleural effusion. No effusion on the right. Elevation of the right hemidiaphragm.  Sternotomy and aortic valve replacement. Normal cardiac size. Dense coronary artery calcifications. No   pericardial effusion.    HEPATOBILIARY: Tiny dependent calcified gallstones without biliary dilatation or adjacent inflammation. No discrete hepatic lesion.    PANCREAS: Normal.    SPLEEN: Mildly enlarged without discrete lesion, AP length measures 14 cm (image 73, series 2).    ADRENAL GLANDS: Normal.    KIDNEYS/BLADDER: Right nephrectomy. Postoperative changes lower aspect of the left kidney. Mild left hydronephrosis without obstructive lesion or stone material. Mild left perinephric soft tissue stranding raises the possibility of ongoing infection or   inflammation. Exophytic hypodense cortical cysts in the upper pole of the left kidney which can be confirmed with targeted ultrasound. Partially distended urinary bladder. Mild prostatomegaly.    BOWEL: Mid sigmoid anastomotic staples. Scattered colonic diverticulosis. No obstruction, free gas or free fluid.    LYMPH NODES: No suspicious abdominopelvic adenopathy.    VASCULATURE: Atherosclerotic and borderline aneurysmal distal abdominal aorta measuring 3.0 x 3.1 cm (image 126, series 2). Atherosclerotic and ectatic proximal common iliac arteries measuring 1.4 cm in diameter on each side (image 172, series 2).   Normal-caliber IVC.    PELVIC ORGANS: Prostatomegaly. Mid sigmoid anastomotic staples. Phleboliths. No adenopathy or free fluid.    MUSCULOSKELETAL: Degenerative spine and joints of the pelvis. Sternotomy.      Impression    IMPRESSION:   1.  Right nephrectomy. Postoperative changes lower aspect of the left kidney. Mild left hydronephrosis without obstructive lesion or stone material. Left perinephric soft tissue stranding raises the possibility of ongoing infection or inflammation.   Hypodense cortical cysts in the upper pole of the left kidney which can be confirmed with targeted ultrasound. Mild prostatomegaly.    2.  Cholelithiasis without biliary  dilatation or adjacent inflammation. Mild splenomegaly. Mid sigmoid anastomotic staples. Scattered colonic diverticulosis without acute inflammation, obstruction, free gas or free fluid.    3.  Elevation of the right hemidiaphragm. Tiny left pleural effusion.    4.  Sternotomy and aortic valve replacement. Normal cardiac size. Dense coronary artery calcifications. No pericardial effusion.    5.  Atherosclerotic and borderline aneurysmal distal abdominal aorta. Atherosclerotic and ectatic proximal common iliac artery. Recommend aortoiliac annual surveillance ultrasound.     XR Surgery FREDI Fluoro Less Than 5 Min w Stills    Narrative    EXAM: XR SURGERY FREDI FLUORO LESS THAN 5 MIN W STILLS  LOCATION: [facility]  DATE/TIME: 11/26/2022 1:00 PM    INDICATION: CYSTOSCOPY, LEFT  URETERAL STENT PLACEMENT, Left Retrograde Pyleogram  COMPARISON: None.  TECHNIQUE: Exam performed by surgeon.    FINDINGS:    FLUOROSCOPIC TIME: .7  NUMBER OF IMAGES: 4      Impression    IMPRESSION:  1.  Fluoroscopy used please a left ureteral stent. Proximal pigtail projects over renal pelvis, distal pigtail projects over bladder. Please see operative report.  2.  Left sided embolization coils.

## 2022-11-29 LAB
BACTERIA BLD CULT: ABNORMAL
BACTERIA BLD CULT: ABNORMAL

## 2022-11-30 ENCOUNTER — PATIENT OUTREACH (OUTPATIENT)
Dept: CARE COORDINATION | Facility: CLINIC | Age: 72
End: 2022-11-30

## 2022-11-30 LAB — BACTERIA BLD CULT: NO GROWTH

## 2022-11-30 NOTE — PROGRESS NOTES
Clinic Care Coordination Contact  [facility]: Post-Discharge Note  SITUATION                                                      Admission:    Admission Date: 11/25/22   Reason for Admission: History of renal cell carcinoma  Status post nephrectomy in the past  Acute kidney injury superimposed on stage IIIb chronic kidney disease  Discharge:   Discharge Date: 11/28/22  Discharge Diagnosis: History of renal cell carcinoma  Status post nephrectomy in the past  Acute kidney injury superimposed on stage IIIb chronic kidney disease    BACKGROUND                                                      Per hospital discharge summary and inpatient provider notes:    Simon Martines is a 72 year old male with a past medical history of renal cell carcinoma, coronary artery disease, hypertension, aortic valve replacement, anemia, CKD stage IV presents to hospital with decreased urine output.  The patient reports that he last urinated on Thursday evening around 7 PM prior to the football game.  That evening he experienced chills and shakes but no fever.  The following morning he had normal urine output and later in the day when he still not had not peed he called the nurse helpline who recommended he come into the hospital.  Patient thinks he may have been drinking less fluids over the last 3 days which she attributes to hosting Thanksgiving and being busy.  He reports 2 episodes of diarrhea in the last 48 hours.  The patient denies any other complaints including fevers, nausea, vomiting, chest pain, shortness of breath, swelling, dysuria, changes in urine color.  Of note the patient has a history of right nephrectomy due to renal cell carcinoma and then was diagnosed with a left kidney mass for which she underwent ablation at the [facility] in September 2022.  Prior to that procedure required a stent which was removed on November 9.  The patient reports that he had been feeling well after the stent removal.    ASSESSMENT   "    Discharge Assessment  How are you doing now that you are home?: \"Doing okay, experiencing the same issues as before replacing the stent\"  How are your symptoms? (Red Flag symptoms escalate to triage hotline per guidelines): Unchanged  Do you feel your condition is stable enough to be safe at home until your provider visit?: Yes  Does the patient have their discharge instructions? : Yes  Does the patient have questions regarding their discharge instructions? : No  Were you started on any new medications or were there changes to any of your previous medications? : Yes  Does the patient have all of their medications?: Yes  Do you have questions regarding any of your medications? : No  Do you have all of your needed medical supplies or equipment (DME)?  (i.e. oxygen tank, CPAP, cane, etc.): Yes  Discharge follow-up appointment scheduled within 14 calendar days? : Yes  Discharge Follow Up Appointment Scheduled with?: Primary Care Provider    Post-op ([facility] Only)  If the patient had a surgery or procedure, do they have any questions for a nurse?: No    PLAN                                                      Outpatient Plan:    Follow up with primary care provider, Henrry Scott, within 7 days for hospital follow- up.  The following labs/tests are recommended: BMP to be done on 12/1.  Please send these results to Dr. Sandy.He will need follow up with [place] urologist after hospital discharge for long term management of ureteral stricture complication.  He may experience urinary urgency, frequency, flank pain, blood in urine and bladder spasms in setting of stent.  Has a blood culture drawn on 11/28, this needs to be followed up to make sure it does not grow any bacteria.    No future appointments.      For any urgent concerns, please contact our 24 hour nurse triage line: [phone] ([phone])         ANTWAN Friend  [phone]  [facility]    "

## 2022-12-03 LAB
BACTERIA BLD CULT: NO GROWTH
BACTERIA BLD CULT: NO GROWTH

## 2023-03-15 ENCOUNTER — MEDICAL CORRESPONDENCE (OUTPATIENT)
Dept: HEALTH INFORMATION MANAGEMENT | Facility: CLINIC | Age: 73
End: 2023-03-15
Payer: COMMERCIAL

## 2023-03-17 ENCOUNTER — TELEPHONE (OUTPATIENT)
Dept: OTHER | Facility: CLINIC | Age: 73
End: 2023-03-17
Payer: COMMERCIAL

## 2023-03-17 DIAGNOSIS — N17.9 ACUTE RENAL FAILURE SUPERIMPOSED ON STAGE 3B CHRONIC KIDNEY DISEASE, UNSPECIFIED ACUTE RENAL FAILURE TYPE (H): Primary | ICD-10-CM

## 2023-03-17 DIAGNOSIS — Z01.818 PRE-OP EXAM: ICD-10-CM

## 2023-03-17 DIAGNOSIS — N18.32 ACUTE RENAL FAILURE SUPERIMPOSED ON STAGE 3B CHRONIC KIDNEY DISEASE, UNSPECIFIED ACUTE RENAL FAILURE TYPE (H): Primary | ICD-10-CM

## 2023-03-17 NOTE — TELEPHONE ENCOUNTER
"Pt referred to [facility] by Cristobal Jackson MD (at [facility]) for potential AVF creation.     Chart review, OV note 3/11/23:     \"He is admitted in [place]. He had a ureteral stent replaced 3 days ago. He is now on dialysis.\"     Hx of solitary kidney.     Pt needs to be scheduled for bilateral upper extremity venous mapping and consult with vascular surgery.  Will route to scheduling to coordinate an appointment at next nearest available (upon pt return to [place]).    Appt note: new pt ref by Cristobal Jackson MD (at [facility]) for potential AVF creation. (bilateral upper extremity venous mapping to be scheduled prior).    [facility] [place]  Ph: [phone], Fax: [phone].      PAULINA Calderón, RN  [facility]  "

## 2023-05-22 ENCOUNTER — TRANSFERRED RECORDS (OUTPATIENT)
Dept: HEALTH INFORMATION MANAGEMENT | Facility: CLINIC | Age: 73
End: 2023-05-22
Payer: COMMERCIAL

## 2023-06-25 ENCOUNTER — HEALTH MAINTENANCE LETTER (OUTPATIENT)
Age: 73
End: 2023-06-25

## 2023-09-20 ENCOUNTER — TRANSFERRED RECORDS (OUTPATIENT)
Dept: HEALTH INFORMATION MANAGEMENT | Facility: CLINIC | Age: 73
End: 2023-09-20
Payer: COMMERCIAL

## 2024-08-01 ENCOUNTER — TRANSFERRED RECORDS (OUTPATIENT)
Dept: HEALTH INFORMATION MANAGEMENT | Facility: CLINIC | Age: 74
End: 2024-08-01
Payer: COMMERCIAL

## 2024-08-17 ENCOUNTER — HEALTH MAINTENANCE LETTER (OUTPATIENT)
Age: 74
End: 2024-08-17

## 2025-08-11 PROBLEM — N18.4 CHRONIC KIDNEY DISEASE, STAGE 4 (SEVERE) (H): Status: ACTIVE | Noted: 2017-02-23

## 2025-08-20 ENCOUNTER — MEDICAL CORRESPONDENCE (OUTPATIENT)
Dept: HEALTH INFORMATION MANAGEMENT | Facility: CLINIC | Age: 75
End: 2025-08-20
Payer: COMMERCIAL

## (undated) DEVICE — CATH URETERAL FLEX TIP TIGERTAIL 06FRX70CM 139006

## (undated) DEVICE — PACK CYSTOSCOPY SBA15CYFSI

## (undated) DEVICE — DAVINCI XI DRAPE ARM 470015

## (undated) DEVICE — SYSTEM CLEARIFY VISUALIZATION 21-345

## (undated) DEVICE — LUBRICANT INST ELECTROLUBE EL101

## (undated) DEVICE — DRAPE BREAST/CHEST 29420

## (undated) DEVICE — CATH TRAY FOLEY COUDE SURESTEP 16FR W/URNE MTR STLK A304716A

## (undated) DEVICE — SU MONOCRYL 4-0 PS-2 18" UND Y496G

## (undated) DEVICE — DAVINCI SEAL CANNULA AND STPL 12MM 470380

## (undated) DEVICE — GUIDEWIRE SENSOR DUAL FLEX STR 0.035"X150CM M0066703080

## (undated) DEVICE — SU PROLENE 2-0 SHDA 48" 8533H

## (undated) DEVICE — SOL NACL 0.9% INJ 1000ML BAG 2B1324X

## (undated) DEVICE — GLOVE PROTEXIS MICRO 6.5  2D73PM65

## (undated) DEVICE — SOL NACL 0.9% IRRIG 1000ML BOTTLE 2F7124

## (undated) DEVICE — GLOVE PROTEXIS BLUE W/NEU-THERA 6.5  2D73EB65

## (undated) DEVICE — SUCTION CANISTER MEDIVAC LINER 3000ML W/LID 65651-530

## (undated) DEVICE — STPL CIRCULAR ENDOPATH 29MM CVD ECS29A

## (undated) DEVICE — STPL SKIN PROXIMATE 35 WIDE PMW35

## (undated) DEVICE — ENDO TROCAR CONMED AIRSEAL BLADELESS 08X120MM IAS8-120LP

## (undated) DEVICE — CATH URETERAL OPEN END 6FR AXXCESS

## (undated) DEVICE — SOL WATER IRRIG 3000ML BAG 2B7117

## (undated) DEVICE — SU PDS II 0 CTX 60" Z990G

## (undated) DEVICE — GLOVE PROTEXIS BLUE W/NEU-THERA 7.5  2D73EB75

## (undated) DEVICE — NDL INSUFFLATION 13GA 120MM C2201

## (undated) DEVICE — BAG DRAIN URO FOR SIEMENS 8MM ADAPTER NS CC164NS-A

## (undated) DEVICE — DRAPE POUCH IRR 1016

## (undated) DEVICE — LINEN TOWEL PACK X5 5464

## (undated) DEVICE — DRAIN PENROSE 0.50"X18" LATEX FREE GR203

## (undated) DEVICE — CATH TRAY FOLEY COUDE 16FR STATLOCK LATEX 304416A

## (undated) DEVICE — DEVICE SUTURE GRASPER TROCAR CLOSURE 14GA PMITCSG

## (undated) DEVICE — DAVINCI XI DRAPE COLUMN 470341

## (undated) DEVICE — SOL WATER IRRIG 1000ML BOTTLE 2F7114

## (undated) DEVICE — ESU HOLDER LAP INST DISP PURPLE LONG 330MM H-PRO-330

## (undated) DEVICE — Device

## (undated) DEVICE — DAVINCI XI REDUCER 8-12MM 470381

## (undated) DEVICE — TUBING SUCTION 12"X1/4" N612

## (undated) DEVICE — DAVINCI XI OBTURATOR BLADELESS 8MM 470359

## (undated) DEVICE — PAD CHUX UNDERPAD 23X24" 7136

## (undated) DEVICE — LUBRICANT INST KIT ENDO-LUBE 220-90

## (undated) DEVICE — DAVINCI XI GRASPER FENESTRATED TIP UP 8MM 470347

## (undated) DEVICE — LIGHT HANDLE X2

## (undated) DEVICE — DRAPE MAYO STAND 23X54 8337

## (undated) DEVICE — SU VICRYL 2-0 TIE 12X18" J905T

## (undated) DEVICE — GOWN IMPERVIOUS SPECIALTY XL/XLONG 39049

## (undated) DEVICE — ESU GROUND PAD UNIVERSAL W/O CORD

## (undated) DEVICE — GLOVE PROTEXIS W/NEU-THERA 8.0  2D73TE80

## (undated) DEVICE — DAVINCI XI RETR ENDOWRIST SMALL GRAPTOR 470318

## (undated) DEVICE — SU VICRYL 0 TIE 12X18" J906G

## (undated) DEVICE — TUBING CONMED AIRSEAL SMOKE EVAC INSUFFLATION ASM-EVAC

## (undated) DEVICE — DRAPE IOBAN INCISE 23X17" 6650EZ

## (undated) DEVICE — RAD RX ISOVUE 300 (50ML) 61% IOPAMIDOL CHARGE PER ML

## (undated) DEVICE — GLOVE PROTEXIS W/NEU-THERA 7.5  2D73TE75

## (undated) DEVICE — PREP CHLORAPREP 26ML TINTED ORANGE  260815

## (undated) DEVICE — SPONGE RAY-TEC 4X8" 7318

## (undated) DEVICE — DAVINCI HOT SHEARS TIP COVER  400180

## (undated) DEVICE — SYR BULB IRRIG 50ML LATEX FREE 0035280

## (undated) DEVICE — ADH SKIN CLOSURE PREMIERPRO EXOFIN 1.0ML 3470

## (undated) DEVICE — PACK LAP CHOLE SLC15LCFSD

## (undated) DEVICE — ESU PENCIL W/HOLSTER E2350H

## (undated) DEVICE — SPONGE LAP 18X18" 23250-400

## (undated) DEVICE — KIT PATIENT POSITIONING PIGAZZI LATEX FREE 40580

## (undated) DEVICE — DRAPE SHEET REV FOLD 3/4 9349

## (undated) DEVICE — SUCTION IRR STRYKERFLOW II W/TIP 250-070-520

## (undated) DEVICE — DRAPE LEGGINGS 8421

## (undated) DEVICE — DAVINCI XI SEAL UNIVERSAL 5-8MM 470361

## (undated) RX ORDER — CIPROFLOXACIN 2 MG/ML
INJECTION, SOLUTION INTRAVENOUS
Status: DISPENSED
Start: 2020-02-18

## (undated) RX ORDER — HEPARIN SODIUM 5000 [USP'U]/.5ML
INJECTION, SOLUTION INTRAVENOUS; SUBCUTANEOUS
Status: DISPENSED
Start: 2020-02-18

## (undated) RX ORDER — ALBUMIN, HUMAN INJ 5% 5 %
SOLUTION INTRAVENOUS
Status: DISPENSED
Start: 2020-02-18

## (undated) RX ORDER — FENTANYL CITRATE 50 UG/ML
INJECTION, SOLUTION INTRAMUSCULAR; INTRAVENOUS
Status: DISPENSED
Start: 2022-11-26

## (undated) RX ORDER — CELECOXIB 200 MG/1
CAPSULE ORAL
Status: DISPENSED
Start: 2020-02-18

## (undated) RX ORDER — VECURONIUM BROMIDE 1 MG/ML
INJECTION, POWDER, LYOPHILIZED, FOR SOLUTION INTRAVENOUS
Status: DISPENSED
Start: 2020-02-18

## (undated) RX ORDER — NEOSTIGMINE METHYLSULFATE 1 MG/ML
VIAL (ML) INJECTION
Status: DISPENSED
Start: 2020-02-18

## (undated) RX ORDER — ONDANSETRON 2 MG/ML
INJECTION INTRAMUSCULAR; INTRAVENOUS
Status: DISPENSED
Start: 2020-02-18

## (undated) RX ORDER — HYDROMORPHONE HYDROCHLORIDE 1 MG/ML
INJECTION, SOLUTION INTRAMUSCULAR; INTRAVENOUS; SUBCUTANEOUS
Status: DISPENSED
Start: 2020-02-18

## (undated) RX ORDER — HYDRALAZINE HYDROCHLORIDE 20 MG/ML
INJECTION INTRAMUSCULAR; INTRAVENOUS
Status: DISPENSED
Start: 2020-02-18

## (undated) RX ORDER — PROPOFOL 10 MG/ML
INJECTION, EMULSION INTRAVENOUS
Status: DISPENSED
Start: 2020-02-18

## (undated) RX ORDER — BUPIVACAINE HYDROCHLORIDE 5 MG/ML
INJECTION, SOLUTION EPIDURAL; INTRACAUDAL
Status: DISPENSED
Start: 2020-02-18

## (undated) RX ORDER — FENTANYL CITRATE 50 UG/ML
INJECTION, SOLUTION INTRAMUSCULAR; INTRAVENOUS
Status: DISPENSED
Start: 2020-02-18

## (undated) RX ORDER — ALVIMOPAN 12 MG/1
CAPSULE ORAL
Status: DISPENSED
Start: 2020-02-18

## (undated) RX ORDER — ACETAMINOPHEN 325 MG/1
TABLET ORAL
Status: DISPENSED
Start: 2020-02-18

## (undated) RX ORDER — LIDOCAINE HYDROCHLORIDE 20 MG/ML
INJECTION, SOLUTION EPIDURAL; INFILTRATION; INTRACAUDAL; PERINEURAL
Status: DISPENSED
Start: 2020-02-18

## (undated) RX ORDER — GLYCOPYRROLATE 0.2 MG/ML
INJECTION, SOLUTION INTRAMUSCULAR; INTRAVENOUS
Status: DISPENSED
Start: 2020-02-18

## (undated) RX ORDER — LIDOCAINE HYDROCHLORIDE 20 MG/ML
JELLY TOPICAL
Status: DISPENSED
Start: 2020-02-18

## (undated) RX ORDER — INDOCYANINE GREEN AND WATER 25 MG
KIT INJECTION
Status: DISPENSED
Start: 2020-02-18

## (undated) RX ORDER — LABETALOL HYDROCHLORIDE 5 MG/ML
INJECTION, SOLUTION INTRAVENOUS
Status: DISPENSED
Start: 2020-02-18

## (undated) RX ORDER — DEXAMETHASONE SODIUM PHOSPHATE 4 MG/ML
INJECTION, SOLUTION INTRA-ARTICULAR; INTRALESIONAL; INTRAMUSCULAR; INTRAVENOUS; SOFT TISSUE
Status: DISPENSED
Start: 2020-02-18